# Patient Record
Sex: MALE | Race: ASIAN | NOT HISPANIC OR LATINO | URBAN - METROPOLITAN AREA
[De-identification: names, ages, dates, MRNs, and addresses within clinical notes are randomized per-mention and may not be internally consistent; named-entity substitution may affect disease eponyms.]

---

## 2022-04-24 ENCOUNTER — INPATIENT (INPATIENT)
Facility: HOSPITAL | Age: 32
LOS: 7 days | Discharge: ANOTHER HOSPITAL | DRG: 489 | End: 2022-05-02
Attending: INTERNAL MEDICINE | Admitting: INTERNAL MEDICINE
Payer: COMMERCIAL

## 2022-04-24 VITALS
WEIGHT: 199.96 LBS | RESPIRATION RATE: 16 BRPM | TEMPERATURE: 99 F | DIASTOLIC BLOOD PRESSURE: 77 MMHG | HEART RATE: 91 BPM | SYSTOLIC BLOOD PRESSURE: 116 MMHG | OXYGEN SATURATION: 99 % | HEIGHT: 67 IN

## 2022-04-24 DIAGNOSIS — M25.561 PAIN IN RIGHT KNEE: ICD-10-CM

## 2022-04-24 DIAGNOSIS — Z29.9 ENCOUNTER FOR PROPHYLACTIC MEASURES, UNSPECIFIED: ICD-10-CM

## 2022-04-24 DIAGNOSIS — S86.812A STRAIN OF OTHER MUSCLE(S) AND TENDON(S) AT LOWER LEG LEVEL, LEFT LEG, INITIAL ENCOUNTER: ICD-10-CM

## 2022-04-24 DIAGNOSIS — Z98.890 OTHER SPECIFIED POSTPROCEDURAL STATES: Chronic | ICD-10-CM

## 2022-04-24 LAB
ANION GAP SERPL CALC-SCNC: 6 MMOL/L — SIGNIFICANT CHANGE UP (ref 5–17)
BUN SERPL-MCNC: 15 MG/DL — SIGNIFICANT CHANGE UP (ref 7–18)
CALCIUM SERPL-MCNC: 9.7 MG/DL — SIGNIFICANT CHANGE UP (ref 8.4–10.5)
CHLORIDE SERPL-SCNC: 106 MMOL/L — SIGNIFICANT CHANGE UP (ref 96–108)
CO2 SERPL-SCNC: 27 MMOL/L — SIGNIFICANT CHANGE UP (ref 22–31)
CREAT SERPL-MCNC: 1.27 MG/DL — SIGNIFICANT CHANGE UP (ref 0.5–1.3)
EGFR: 77 ML/MIN/1.73M2 — SIGNIFICANT CHANGE UP
GLUCOSE SERPL-MCNC: 96 MG/DL — SIGNIFICANT CHANGE UP (ref 70–99)
HCT VFR BLD CALC: 51.2 % — HIGH (ref 39–50)
HGB BLD-MCNC: 16.8 G/DL — SIGNIFICANT CHANGE UP (ref 13–17)
MCHC RBC-ENTMCNC: 26.4 PG — LOW (ref 27–34)
MCHC RBC-ENTMCNC: 32.8 GM/DL — SIGNIFICANT CHANGE UP (ref 32–36)
MCV RBC AUTO: 80.4 FL — SIGNIFICANT CHANGE UP (ref 80–100)
NRBC # BLD: 0 /100 WBCS — SIGNIFICANT CHANGE UP (ref 0–0)
PLATELET # BLD AUTO: 307 K/UL — SIGNIFICANT CHANGE UP (ref 150–400)
POTASSIUM SERPL-MCNC: 5.3 MMOL/L — SIGNIFICANT CHANGE UP (ref 3.5–5.3)
POTASSIUM SERPL-SCNC: 5.3 MMOL/L — SIGNIFICANT CHANGE UP (ref 3.5–5.3)
RBC # BLD: 6.37 M/UL — HIGH (ref 4.2–5.8)
RBC # FLD: 12.8 % — SIGNIFICANT CHANGE UP (ref 10.3–14.5)
SARS-COV-2 RNA SPEC QL NAA+PROBE: SIGNIFICANT CHANGE UP
SODIUM SERPL-SCNC: 139 MMOL/L — SIGNIFICANT CHANGE UP (ref 135–145)
WBC # BLD: 15.69 K/UL — HIGH (ref 3.8–10.5)
WBC # FLD AUTO: 15.69 K/UL — HIGH (ref 3.8–10.5)

## 2022-04-24 PROCEDURE — 99285 EMERGENCY DEPT VISIT HI MDM: CPT

## 2022-04-24 PROCEDURE — 73562 X-RAY EXAM OF KNEE 3: CPT | Mod: 26,50

## 2022-04-24 PROCEDURE — 76376 3D RENDER W/INTRP POSTPROCES: CPT | Mod: 26

## 2022-04-24 PROCEDURE — 73700 CT LOWER EXTREMITY W/O DYE: CPT | Mod: 26,50,MA

## 2022-04-24 RX ORDER — ENOXAPARIN SODIUM 100 MG/ML
40 INJECTION SUBCUTANEOUS EVERY 24 HOURS
Refills: 0 | Status: DISCONTINUED | OUTPATIENT
Start: 2022-04-24 | End: 2022-04-24

## 2022-04-24 RX ORDER — OXYCODONE AND ACETAMINOPHEN 5; 325 MG/1; MG/1
2 TABLET ORAL EVERY 6 HOURS
Refills: 0 | Status: DISCONTINUED | OUTPATIENT
Start: 2022-04-24 | End: 2022-04-27

## 2022-04-24 RX ORDER — POLYETHYLENE GLYCOL 3350 17 G/17G
17 POWDER, FOR SOLUTION ORAL DAILY
Refills: 0 | Status: DISCONTINUED | OUTPATIENT
Start: 2022-04-24 | End: 2022-05-02

## 2022-04-24 RX ORDER — ONDANSETRON 8 MG/1
4 TABLET, FILM COATED ORAL EVERY 6 HOURS
Refills: 0 | Status: DISCONTINUED | OUTPATIENT
Start: 2022-04-24 | End: 2022-04-27

## 2022-04-24 RX ORDER — KETOROLAC TROMETHAMINE 30 MG/ML
30 SYRINGE (ML) INJECTION ONCE
Refills: 0 | Status: DISCONTINUED | OUTPATIENT
Start: 2022-04-24 | End: 2022-04-24

## 2022-04-24 RX ORDER — OXYCODONE AND ACETAMINOPHEN 5; 325 MG/1; MG/1
1 TABLET ORAL ONCE
Refills: 0 | Status: DISCONTINUED | OUTPATIENT
Start: 2022-04-24 | End: 2022-04-24

## 2022-04-24 RX ORDER — OXYCODONE AND ACETAMINOPHEN 5; 325 MG/1; MG/1
1 TABLET ORAL EVERY 4 HOURS
Refills: 0 | Status: DISCONTINUED | OUTPATIENT
Start: 2022-04-24 | End: 2022-04-27

## 2022-04-24 RX ORDER — ACETAMINOPHEN 500 MG
650 TABLET ORAL EVERY 4 HOURS
Refills: 0 | Status: DISCONTINUED | OUTPATIENT
Start: 2022-04-24 | End: 2022-04-27

## 2022-04-24 RX ADMIN — OXYCODONE AND ACETAMINOPHEN 1 TABLET(S): 5; 325 TABLET ORAL at 18:15

## 2022-04-24 RX ADMIN — OXYCODONE AND ACETAMINOPHEN 1 TABLET(S): 5; 325 TABLET ORAL at 13:19

## 2022-04-24 RX ADMIN — Medication 30 MILLIGRAM(S): at 18:39

## 2022-04-24 RX ADMIN — Medication 30 MILLIGRAM(S): at 16:41

## 2022-04-24 NOTE — ED PROVIDER NOTE - PHYSICAL EXAMINATION
General: pt lying in stretcher, appears stated age and is not in distress  HEENT: AT/NC, pink conjunctiva, anicteric sclerae,mmm  Neck: supple, full ROM, trachea midline, no JVD, no cervical LAD, no midline ttp or stepoffs  Lungs: symmetric excursion, no respiratory distress  Heart: rrr, S1, S2 normal; no S3 or S4; no murmurs or rubs  Abd: normal bowel sounds; soft, nontender;   Extremities: no clubbing, cyanosis, or edema; + palpable b/l knee effusion w/ tenderness and pain limited ROM  Skin: good turgor; no rashes, petechiae, ecchymoses, or jaundice  Pulses: radial, posterior tibialis (PT), dorsalis pedis (DP) all 2+ & symmetric  Neuro: awake, alert, responsive; oriented to person, place and time; cranial nerves intact, EOMI, intact jaw movement, intact facial sensation, no facial asymmetry, hearing intact; Motor: Normal tone in upper and lower extremities bilaterally strength 5/5; Sensory: intact

## 2022-04-24 NOTE — ED PROVIDER NOTE - CARE PLAN
1 Principal Discharge DX:	Pain of knee after injury   Principal Discharge DX:	Rupture of left patellar tendon  Secondary Diagnosis:	Ambulatory dysfunction

## 2022-04-24 NOTE — H&P ADULT - NSHPREVIEWOFSYSTEMS_GEN_ALL_CORE
CONSTITUTIONAL: No fever, weight loss, or fatigue  EYES: No eye pain, visual disturbances, or discharge  ENT:  No difficulty hearing, tinnitus, vertigo; No sinus or throat pain  NECK: No pain or stiffness  RESPIRATORY: No cough, wheezing, chills or hemoptysis; No Shortness of Breath  CARDIOVASCULAR: No chest pain, palpitations, passing out, dizziness, or leg swelling  GASTROINTESTINAL: No abdominal or epigastric pain. No nausea, vomiting, or hematemesis; No diarrhea or constipation. No melena or hematochezia.  GENITOURINARY: No dysuria, frequency, hematuria, or incontinence  NEUROLOGICAL: No headaches, memory loss, loss of strength, numbness, or tremors  SKIN: No itching, burning, rashes, or lesions   LYMPH Nodes: No enlarged glands  ENDOCRINE: No heat or cold intolerance; No hair loss  MUSCULOSKELETAL: b/l knee pain and swelling  PSYCHIATRIC: No depression, anxiety, mood swings, or difficulty sleeping  HEME/LYMPH: No easy bruising, or bleeding gums  ALLERGY AND IMMUNOLOGIC: No hives or eczema

## 2022-04-24 NOTE — ED PROVIDER NOTE - OBJECTIVE STATEMENT
32 yo m no pmh, PSH right knee surgery ORIF, now p/w b/l knee pain after fall. Pt reports he was playing basketball and when he attempted to jump up to make a shot, his legs gave out from under him and he instead fell onto his knees directly. No other injuries. He was unable to walk after. No head trauma

## 2022-04-24 NOTE — H&P ADULT - PROBLEM SELECTOR PLAN 1
Pt fell onto b/l knee, unable to ambulate  Ct shows L patellar tendon rupture, fracture of right knee hardware  b/l knee immobilizer placed in ED      Pain control  DVT prophylaxis  Ortho consulted by ED provider Pt fell onto b/l knee, unable to ambulate  Ct shows L patellar tendon rupture, fracture of right knee hardware  b/l knee immobilizer placed in ED      Pain control  PT consult  DVT prophylaxis  Ortho consulted by ED provider Pt fell onto b/l knee, unable to ambulate  Ct shows L patellar tendon rupture, fracture of right knee hardware  b/l knee immobilizer placed in ED      Pain control  PT consult  NPO at midnight, pre-op labs ordered in case of procedure tomorrow  Ortho consulted by ED provider

## 2022-04-24 NOTE — ED PROVIDER NOTE - CLINICAL SUMMARY MEDICAL DECISION MAKING FREE TEXT BOX
Pt w/ aforementioned presentation concerning for but not limited to tibial plateau fx, patellar fractures, soft tissue injury   Will get labs, imaging, treat symptoms, monitor and reassess.  Pt signed out to Dr. Jameson pending CT results

## 2022-04-24 NOTE — ED ADULT NURSE NOTE - CCCP TRG CHIEF CMPLNT
Had both Moderna vaccines, last one on 2/7/2021.  1 week after vaccine started developing cold symptoms.  Worsening cough and congestion. Tested for COVID times two, negative.  Now cough and congestion has subsided.  2 days ago with right ear pain.  Pain worsens with pushsing on right ear. Denies drainage.   knee pain/injury

## 2022-04-24 NOTE — H&P ADULT - ASSESSMENT
30 yo m no PMHx and PSHx of right knee ORIF is presenting with bilateral knee pain. Pt was playing basketball, attempted to jump but fell onto his knees. CT shows concern for patellar ligament rupture in L knee and fractured hardware in R knee with small hematoma. Ortho was consulted by ED provider, no acute intervention at this time, will reassess in a.m. Ortho will reassess in a.m.

## 2022-04-24 NOTE — H&P ADULT - NSICDXPASTSURGICALHX_GEN_ALL_CORE_FT
PAST SURGICAL HISTORY:  History of open reduction and internal fixation (ORIF) procedure Right knee

## 2022-04-24 NOTE — ED PROVIDER NOTE - PROGRESS NOTE DETAILS
Jameson:  Pt received in signout from Dr. Siegel to consult ortho.  D/w Dr. Coffey, ortho attending, and he advised to admit to medicine, does not appear to be operative at this time but his team will evaluate tomorrow morning.   He advised b/l knee immobilizers and analgesia. Gisell:  D/w Dr. Munoz and he agrees to admit.

## 2022-04-24 NOTE — H&P ADULT - NSHPPHYSICALEXAM_GEN_ALL_CORE
Vital Signs Last 24 Hrs  T(C): 37.1 (24 Apr 2022 15:18), Max: 37.1 (24 Apr 2022 10:20)  T(F): 98.7 (24 Apr 2022 15:18), Max: 98.7 (24 Apr 2022 10:20)  HR: 98 (24 Apr 2022 15:18) (91 - 98)  BP: 110/75 (24 Apr 2022 15:18) (110/75 - 116/77)  BP(mean): --  RR: 18 (24 Apr 2022 15:18) (16 - 18)  SpO2: 96% (24 Apr 2022 15:18) (96% - 99%)    GENERAL: NAD, speaks in full sentences, no signs of respiratory distress  HEAD:  Atraumatic, Normocephalic  EYES: EOMI, PERRLA, conjunctiva and sclera clear  NECK: Supple, No JVD  CHEST/LUNG: Clear to auscultation bilaterally; No wheeze; No crackles; No accessory muscles used  HEART: Regular rate and rhythm; No murmurs;   ABDOMEN: Soft, Nontender, Nondistended; Bowel sounds present; No guarding  EXTREMITIES:  + b/l knee effusion, ttp, limited ROM, pulses intact +2 b/l  PSYCH:  Normal Affect  NEUROLOGY: non-focal, AAO X 3. Strength is 5/5. no sensory loss.  SKIN: No rashes or lesions Vital Signs Last 24 Hrs  T(C): 37.1 (24 Apr 2022 15:18), Max: 37.1 (24 Apr 2022 10:20)  T(F): 98.7 (24 Apr 2022 15:18), Max: 98.7 (24 Apr 2022 10:20)  HR: 98 (24 Apr 2022 15:18) (91 - 98)  BP: 110/75 (24 Apr 2022 15:18) (110/75 - 116/77)  BP(mean): --  RR: 18 (24 Apr 2022 15:18) (16 - 18)  SpO2: 96% (24 Apr 2022 15:18) (96% - 99%)    GENERAL: NAD, speaks in full sentences, no signs of respiratory distress  HEAD:  Atraumatic, Normocephalic  EYES: EOMI, PERRLA, conjunctiva and sclera clear  NECK: Supple, No JVD  CHEST/LUNG: Clear to auscultation bilaterally; No wheeze; No crackles; No accessory muscles used  HEART: Regular rate and rhythm; No murmurs;   ABDOMEN: Soft, Nontender, Nondistended; Bowel sounds present; No guarding  EXTREMITIES:  + b/l knee effusion, ttp, limited ROM, pulses intact +2 b/l  PSYCH:  Normal Affect  NEUROLOGY: non-focal, AAO X 3. no sensory loss.  SKIN: No rashes or lesions

## 2022-04-24 NOTE — H&P ADULT - HISTORY OF PRESENT ILLNESS
32 yo m no PMHx and PSHx of right knee ORIF is presenting with bilateral knee pain. Pt was playing basketball, attempted to jump but fell onto his knees. He experienced severe pain and was unable to ambulate. He denies head trauma, CP, SOB, dizziness, paresthesias or cold extremities.     In ED: CT shows concern for patellar ligament rupture in L knee and fractured hardware in R knee with small hematoma. Ortho was consulted by ED provider, no acute intervention at this time, will reassess in a.m.

## 2022-04-25 LAB
ANION GAP SERPL CALC-SCNC: 7 MMOL/L — SIGNIFICANT CHANGE UP (ref 5–17)
BLD GP AB SCN SERPL QL: SIGNIFICANT CHANGE UP
BUN SERPL-MCNC: 21 MG/DL — HIGH (ref 7–18)
CALCIUM SERPL-MCNC: 9.6 MG/DL — SIGNIFICANT CHANGE UP (ref 8.4–10.5)
CHLORIDE SERPL-SCNC: 107 MMOL/L — SIGNIFICANT CHANGE UP (ref 96–108)
CO2 SERPL-SCNC: 27 MMOL/L — SIGNIFICANT CHANGE UP (ref 22–31)
CREAT SERPL-MCNC: 1.38 MG/DL — HIGH (ref 0.5–1.3)
EGFR: 70 ML/MIN/1.73M2 — SIGNIFICANT CHANGE UP
GLUCOSE SERPL-MCNC: 104 MG/DL — HIGH (ref 70–99)
HCT VFR BLD CALC: 49.7 % — SIGNIFICANT CHANGE UP (ref 39–50)
HGB BLD-MCNC: 16.1 G/DL — SIGNIFICANT CHANGE UP (ref 13–17)
INR BLD: 1.07 RATIO — SIGNIFICANT CHANGE UP (ref 0.88–1.16)
MCHC RBC-ENTMCNC: 26.8 PG — LOW (ref 27–34)
MCHC RBC-ENTMCNC: 32.4 GM/DL — SIGNIFICANT CHANGE UP (ref 32–36)
MCV RBC AUTO: 82.7 FL — SIGNIFICANT CHANGE UP (ref 80–100)
NRBC # BLD: 0 /100 WBCS — SIGNIFICANT CHANGE UP (ref 0–0)
PLATELET # BLD AUTO: 255 K/UL — SIGNIFICANT CHANGE UP (ref 150–400)
POTASSIUM SERPL-MCNC: 4 MMOL/L — SIGNIFICANT CHANGE UP (ref 3.5–5.3)
POTASSIUM SERPL-SCNC: 4 MMOL/L — SIGNIFICANT CHANGE UP (ref 3.5–5.3)
PROTHROM AB SERPL-ACNC: 12.7 SEC — SIGNIFICANT CHANGE UP (ref 10.5–13.4)
RBC # BLD: 6.01 M/UL — HIGH (ref 4.2–5.8)
RBC # FLD: 13.4 % — SIGNIFICANT CHANGE UP (ref 10.3–14.5)
SODIUM SERPL-SCNC: 141 MMOL/L — SIGNIFICANT CHANGE UP (ref 135–145)
WBC # BLD: 9.46 K/UL — SIGNIFICANT CHANGE UP (ref 3.8–10.5)
WBC # FLD AUTO: 9.46 K/UL — SIGNIFICANT CHANGE UP (ref 3.8–10.5)

## 2022-04-25 RX ORDER — SODIUM CHLORIDE 9 MG/ML
1000 INJECTION, SOLUTION INTRAVENOUS
Refills: 0 | Status: DISCONTINUED | OUTPATIENT
Start: 2022-04-25 | End: 2022-04-25

## 2022-04-25 RX ORDER — CHLORHEXIDINE GLUCONATE 213 G/1000ML
1 SOLUTION TOPICAL EVERY 12 HOURS
Refills: 0 | Status: DISCONTINUED | OUTPATIENT
Start: 2022-04-25 | End: 2022-04-27

## 2022-04-25 RX ORDER — MUPIROCIN 20 MG/G
1 OINTMENT TOPICAL
Refills: 0 | Status: DISCONTINUED | OUTPATIENT
Start: 2022-04-25 | End: 2022-04-27

## 2022-04-25 RX ADMIN — OXYCODONE AND ACETAMINOPHEN 1 TABLET(S): 5; 325 TABLET ORAL at 11:35

## 2022-04-25 RX ADMIN — SODIUM CHLORIDE 65 MILLILITER(S): 9 INJECTION, SOLUTION INTRAVENOUS at 16:58

## 2022-04-25 RX ADMIN — OXYCODONE AND ACETAMINOPHEN 2 TABLET(S): 5; 325 TABLET ORAL at 00:36

## 2022-04-25 RX ADMIN — CHLORHEXIDINE GLUCONATE 1 APPLICATION(S): 213 SOLUTION TOPICAL at 17:37

## 2022-04-25 RX ADMIN — OXYCODONE AND ACETAMINOPHEN 1 TABLET(S): 5; 325 TABLET ORAL at 10:43

## 2022-04-25 RX ADMIN — OXYCODONE AND ACETAMINOPHEN 2 TABLET(S): 5; 325 TABLET ORAL at 15:23

## 2022-04-25 RX ADMIN — OXYCODONE AND ACETAMINOPHEN 2 TABLET(S): 5; 325 TABLET ORAL at 00:02

## 2022-04-25 RX ADMIN — OXYCODONE AND ACETAMINOPHEN 2 TABLET(S): 5; 325 TABLET ORAL at 16:15

## 2022-04-25 RX ADMIN — MUPIROCIN 1 APPLICATION(S): 20 OINTMENT TOPICAL at 17:38

## 2022-04-25 NOTE — CONSULT NOTE ADULT - NS ATTEND AMEND GEN_ALL_CORE FT
Pt seen and examined, along with PA SP, JH, and OH.  Bilateral patellar tendon ruptures  Plan for repair on Wed 4/27 in late afternoon.  Risks discussed: infection, rerupture,, stiff joints, blood clots, etc...  He verbally consented for me to do the surgery.

## 2022-04-25 NOTE — PROGRESS NOTE ADULT - ASSESSMENT
32 yo m no PMHx and PSHx of right knee ORIF is presented with bilateral knee pain. Pt was playing basketball, attempted to jump but fell onto his knees. CT shows concern for patellar ligament rupture in L knee and fractured hardware in R knee with small hematoma. Ortho was consulted by ED provider, no acute intervention at this time, will reassess in a.m. Ortho will reassess in a.m.   32 yo m no PMHx and PSHx of right knee ORIF presented with bilateral knee pain. s/p fell onto his knees while playing basketball.  CT revealed concern for patellar ligament rupture in L knee and fractured hardware in R knee with small hematoma. Plan for surgery either today or to-rosales, Keep NPO, Ortho following.

## 2022-04-25 NOTE — PATIENT PROFILE ADULT - FALL HARM RISK - HARM RISK INTERVENTIONS

## 2022-04-25 NOTE — PROGRESS NOTE ADULT - SUBJECTIVE AND OBJECTIVE BOX
Patient is a 31y old  Male who presents with a chief complaint of     INTERVAL HPI/OVERNIGHT EVENTS: No acute evets overnight    REVIEW OF SYSTEMS:  CONSTITUTIONAL: No fever, chills  ENMT:  No difficulty hearing, no change in vision  NECK: No pain or stiffness  RESPIRATORY: No cough, SOB  CARDIOVASCULAR: No chest pain, palpitations  GASTROINTESTINAL: No abdominal pain. No nausea, vomiting, or diarrhea  GENITOURINARY: No dysuria  NEUROLOGICAL: No HA  SKIN: No itching, burning, rashes, or lesions   LYMPH NODES: No enlarged glands  ENDOCRINE: No heat or cold intolerance; No hair loss  MUSCULOSKELETAL: Bilat Knee pain  PSYCHIATRIC: No depression, anxiety  HEME/LYMPH: No easy bruising, or bleeding gums    T(C): 37.1 (04-25-22 @ 15:00), Max: 37.3 (04-25-22 @ 10:20)  HR: 83 (04-25-22 @ 15:00) (78 - 97)  BP: 137/75 (04-25-22 @ 15:00) (131/90 - 146/95)  RR: 18 (04-25-22 @ 15:00) (16 - 18)  SpO2: 97% (04-25-22 @ 15:00) (97% - 98%)  Wt(kg): --Vital Signs Last 24 Hrs  T(C): 37.1 (25 Apr 2022 15:00), Max: 37.3 (25 Apr 2022 10:20)  T(F): 98.8 (25 Apr 2022 15:00), Max: 99.1 (25 Apr 2022 10:20)  HR: 83 (25 Apr 2022 15:00) (78 - 97)  BP: 137/75 (25 Apr 2022 15:00) (131/90 - 146/95)  BP(mean): 101 (24 Apr 2022 19:30) (101 - 101)  RR: 18 (25 Apr 2022 15:00) (16 - 18)  SpO2: 97% (25 Apr 2022 15:00) (97% - 98%)    MEDICATIONS  (STANDING):  polyethylene glycol 3350 17 Gram(s) Oral daily    MEDICATIONS  (PRN):  acetaminophen     Tablet .. 650 milliGRAM(s) Oral every 4 hours PRN Mild Pain (1 - 3)  ondansetron    Tablet 4 milliGRAM(s) Oral every 6 hours PRN Nausea and/or Vomiting  oxycodone    5 mG/acetaminophen 325 mG 1 Tablet(s) Oral every 4 hours PRN Moderate Pain (4 - 6)  oxycodone    5 mG/acetaminophen 325 mG 2 Tablet(s) Oral every 6 hours PRN Severe Pain (7 - 10)    PHYSICAL EXAM:  GENERAL: NAD  EYES: clear conjunctiva; EOMI  ENMT: Moist mucous membranes  NECK: Supple, No JVD, Normal thyroid  CHEST/LUNG: Clear to auscultation bilaterally; No rales, rhonchi, wheezing, or rubs  HEART: S1, S2, Regular rate and rhythm  ABDOMEN: Soft, Nontender, Nondistended; Bowel sounds present  NEURO: Alert & Oriented X3  EXTREMITIES: BLE swelling, R knee old ORIF, site CDI, with knee mobilizer  SKIN: No rashes or lesions    Consultant(s) Notes Reviewed:  [x ] YES  [ ] NO  Care Discussed with Consultants/Other Providers [ x] YES  [ ] NO    LABS:                        16.1   9.46  )-----------( 255      ( 25 Apr 2022 06:14 )             49.7     04-25    141  |  107  |  21<H>  ----------------------------<  104<H>  4.0   |  27  |  1.38<H>    Ca    9.6      25 Apr 2022 06:14      PT/INR - ( 25 Apr 2022 06:14 )   PT: 12.7 sec;   INR: 1.07 ratio         CAPILLARY BLOOD GLUCOSE    RADIOLOGY & ADDITIONAL TESTS:    Imaging Personally Reviewed:  [x ] YES  [ ] NO  < from: CT 3D Reconstruct w/o Workstation (04.24.22 @ 14:07) >  ACC: [id number] EXAM:  CT 3D RECONSTRUCT  MAURICIO                        ACC: [id number] EXAM:  CT KNEE ONLY BI                          PROCEDURE DATE:  04/24/2022          INTERPRETATION:  CT OF THE BILATERAL KNEES    CLINICAL INFORMATION: Bilateral knee pain after playing basketball.   Evaluate for bilateral knee fractures.  TECHNIQUE: Multidetector CT of the bilateral knees. The study was   performed without the use of intravenous or intra-articular contrast.   Multiplanar reformats were generated for review. Three dimensional   reconstructions were obtained on an independent workstation. The   interpretation of these images is included in the body of the report of   the main portion of the study.    COMPARISON: Bilateral knee radiographs 4/24/2022.    FINDINGS:    RIGHT KNEE:    HARDWARE: Patient appears to be status post repair of the extensor   mechanism using cerclage wire. The wire is fractured in multiple places.    BONE: No acute fracture. No focal lytic or blastic lesions. Postsurgical   changes in the anterior aspect of the patella and tibia.    JOINTS: No dislocation. No knee joint effusion or lipohemarthrosis.   Cartilage spaces are maintained.    SOFT TISSUE: No focal muscle atrophy. Neurovascular structures are normal   in course and caliber. Edema in the subcutaneous fat along the anterior   aspect of the knee joint.      LEFT KNEE:    BONE: No acute fracture    JOINTS: No dislocation. No joint effusion or lipohemarthrosis.    SOFT TISSUE: Irregular appearance of the proximal patellar tendon   suspicious for tear. Mild amount of edema is seen in the surrounding   subcutaneous fat. No focal muscle atrophy. Neurovascular structures are   normal in course and caliber.      IMPRESSION:  1.  No acute fracture ordislocation.  2.  On the left, there is an irregular appearance of the proximal   patellar tendon concerning for patellar tendon tear. Consider   confirmation with ultrasound or MRI.  3.  Chronic postsurgical repair of the right extensor mechanism. Hardware   is fractured in multiple places, as described above.  4.  Edema in the subcutaneous fat along the anterior aspect of the right   knee suspicious for small hematoma.    --- End of Report ---            MONICO LIU MD; Attending Radiologist  This document has been electronically signed. Apr 24 2022  3:32PM    < end of copied text >  < from: CT Knee No Cont, Bilateral (04.24.22 @ 14:07) >  ACC: [id number] EXAM:  CT 3D RECONSTRUCT CHELE MARTÍNEZ                        ACC: [id number] EXAM:  CT KNEE ONLY BI                          PROCEDURE DATE:  04/24/2022          INTERPRETATION:  CT OF THE BILATERAL KNEES    CLINICAL INFORMATION: Bilateral knee pain after playing basketball.   Evaluate for bilateral knee fractures.  TECHNIQUE: Multidetector CT of the bilateral knees. The study was   performed without the use of intravenous or intra-articular contrast.   Multiplanar reformats were generated for review. Three dimensional   reconstructions were obtained on an independent workstation. The   interpretation of these images is included in the body of the report of   the main portion of the study.    COMPARISON: Bilateral knee radiographs 4/24/2022.    FINDINGS:    RIGHT KNEE:    HARDWARE: Patient appears to be status post repair of the extensor   mechanism using cerclage wire. The wire is fractured in multiple places.    BONE: No acute fracture. No focal lytic or blastic lesions. Postsurgical   changes in the anterior aspect of the patella and tibia.    JOINTS: No dislocation. No knee joint effusion or lipohemarthrosis.   Cartilage spaces are maintained.    SOFT TISSUE: No focal muscle atrophy. Neurovascular structures are normal   in course and caliber. Edema in the subcutaneous fat along the anterior   aspect of the knee joint.      LEFT KNEE:    BONE: No acute fracture    JOINTS: No dislocation. No joint effusion or lipohemarthrosis.    SOFT TISSUE: Irregular appearance of the proximal patellar tendon   suspicious for tear. Mild amount of edema is seen in the surrounding   subcutaneous fat. No focal muscle atrophy. Neurovascular structures are   normal in course and caliber.      IMPRESSION:  1.  No acute fracture ordislocation.  2.  On the left, there is an irregular appearance of the proximal   patellar tendon concerning for patellar tendon tear. Consider   confirmation with ultrasound or MRI.  3.  Chronic postsurgical repair of the right extensor mechanism. Hardware   is fractured in multiple places, as described above.  4.  Edema in the subcutaneous fat along the anterior aspect of the right   knee suspicious for small hematoma.    --- End of Report ---            MONICO LIU MD; Attending Radiologist  This document has been electronically signed. Apr 24 2022  3:32PM    < end of copied text >  < from: Xray Knee 3 Views, Bilateral (04.24.22 @ 13:42) >    ACC: [id number] EXAM:  XR KNEE AP LAT OBL 3 VIEWS BI                          PROCEDURE DATE:  04/24/2022          INTERPRETATION:  Clinical history: 31-year-old male, fractures.    Three views of each knee are correlated with a concurrent CT.    FINDINGS: No acute fracture, dislocation or suprapatellar effusion. Prior   repair of the right extensor mechanism using cerclage wires.    IMPRESSION:  No acute radiographic findings, CT performed    --- End of Report ---            ALEKSANDAR SILVER DO; Attending Radiologist  This document has been electronically signed. Apr 25 2022 12:30PM    < end of copied text >

## 2022-04-25 NOTE — PROGRESS NOTE ADULT - PROBLEM SELECTOR PLAN 1
Pt fell onto b/l knee, unable to ambulate  Ct shows L patellar tendon rupture, fracture of right knee hardware  b/l knee immobilizer placed in ED      Pain control  PT consult  NPO at midnight, pre-op labs ordered in case of procedure tomorrow  Ortho consulted by ED provider - s/p fall onto b/l knee, unable to ambulate  - Ct shows L patellar tendon rupture, fracture of right knee hardware, Old ORIF  - C/w b/l knee immobilizer placed in ED  - Plan for surgery today or tomorrow  - c/w pain management  - c/w IVF . NPO for now  - PT consult post surgery  - Orthro following

## 2022-04-25 NOTE — CONSULT NOTE ADULT - SUBJECTIVE AND OBJECTIVE BOX
Pt Name: YANCI QURESHI  MRN: [id number]    ORTHOPEDIC CONSULT    Orthopedic diagnosis:    31yMaleHPI:  30 yo m no PMHx and PSHx of right knee ORIF is presenting with bilateral knee pain. Pt was playing basketball, attempted to jump but fell onto his knees. He experienced severe pain and was unable to ambulate. He denies head trauma, CP, SOB, dizziness, paresthesias or cold extremities.     In ED: CT shows concern for patellar ligament rupture in L knee and fractured hardware in R knee with small hematoma. Ortho was consulted by ED provider, no acute intervention at this time, will reassess in a.m. (24 Apr 2022 18:44)    Patient is s/p sports injury - 4/24 @ 10 am, patient was playing basketball and attempted to jump. He was unable to jump and instead fell forward due to the momentum. he felt immediate pain and inability to bear weight or ambulate. denies Head trauma, LOC, chest pain, SOB, dizziness, etc. He has history of previous right patellar fracture ORIF 6 years ago in the [facility] due to a basketball injury. The patient has minimal throbbing constant pain in the left knee 3/10 and intermittent sharp pain in the right knee 7/10 and 2/10 at rest. Pt denies Chest pain, SOB, dyspnea, paresthesias, N/V/D, abdominal pain, syncope, or pain anywhere else.         AMBULATION: Baseline Ambulation  [   xxx  ] independent   [     ] With Cane   [     ] With Walker   [     ]  Bedbound   [     ] Pivot transfers to Wheelchair only    4M's age-friendly:  - Medication: age-appropriate  - Mentation:  - Mobility:   - Matters-Most/Goals of Care:    PAST MEDICAL & SURGICAL HISTORY:  History of open reduction and internal fixation (ORIF) procedure  Right knee        ALLERGIES: No Known Allergies      MEDICATIONS: acetaminophen     Tablet .. 650 milliGRAM(s) Oral every 4 hours PRN  ondansetron    Tablet 4 milliGRAM(s) Oral every 6 hours PRN  oxycodone    5 mG/acetaminophen 325 mG 1 Tablet(s) Oral every 4 hours PRN  oxycodone    5 mG/acetaminophen 325 mG 2 Tablet(s) Oral every 6 hours PRN  polyethylene glycol 3350 17 Gram(s) Oral daily      PHYSICAL EXAM:    Vital Signs Last 24 Hrs  T(C): 37.3 (25 Apr 2022 10:20), Max: 37.3 (25 Apr 2022 10:20)  T(F): 99.1 (25 Apr 2022 10:20), Max: 99.1 (25 Apr 2022 10:20)  HR: 86 (25 Apr 2022 10:20) (78 - 98)  BP: 139/81 (25 Apr 2022 10:20) (110/75 - 146/95)  BP(mean): 101 (24 Apr 2022 19:30) (101 - 101)  RR: 16 (25 Apr 2022 10:20) (16 - 18)  SpO2: 98% (25 Apr 2022 10:20) (96% - 98%)    Gen: well developed, well nourished, comfortable  MSK:  Skin pink, warm. No open lesions.  no ct  calves soft  DP/PT 2+, brisk b/l  nvi silt  5/5 strength ehl/ta/gastroc b/l.   BLLE: knee immobilizers on both knees. unable to straight leg raise bilaterally. skin intact. significant effusion bilaterally. minimal pain to palpation bilaterally. passive SLR causes no pain.   longitudinal linear healed incision noted of the right knee. skin intact. grossly nvi. distal pulses 2+. silt.          LABS:                        16.1   9.46  )-----------( 255      ( 25 Apr 2022 06:14 )             49.7     04-25    141  |  107  |  21<H>  ----------------------------<  104<H>  4.0   |  27  |  1.38<H>    Ca    9.6      25 Apr 2022 06:14      PT/INR - ( 25 Apr 2022 06:14 )   PT: 12.7 sec;   INR: 1.07 ratio             RADIOLOGY:   < from: CT 3D Reconstruct w/o Workstation (04.24.22 @ 14:07) >    ACC: [id number] EXAM:  CT 3D RECONSTRUCT CHELE MARTÍNEZ                        ACC: [id number] EXAM:  CT KNEE ONLY BI                          PROCEDURE DATE:  04/24/2022          INTERPRETATION:  CT OF THE BILATERAL KNEES    CLINICAL INFORMATION: Bilateral knee pain after playing basketball.   Evaluate for bilateral knee fractures.  TECHNIQUE: Multidetector CT of the bilateral knees. The study was   performed without the use of intravenous or intra-articular contrast.   Multiplanar reformats were generated for review. Three dimensional   reconstructions were obtained on an independent workstation. The   interpretation of these images is included in the body of the report of   the main portion of the study.    COMPARISON: Bilateral knee radiographs 4/24/2022.    FINDINGS:    RIGHT KNEE:    HARDWARE: Patient appears to be status post repair of the extensor   mechanism using cerclage wire. The wire is fractured in multiple places.    BONE: No acute fracture. No focal lytic or blastic lesions. Postsurgical   changes in the anterior aspect of the patella and tibia.    JOINTS: No dislocation. No knee joint effusion or lipohemarthrosis.   Cartilage spaces are maintained.    SOFT TISSUE: No focal muscle atrophy. Neurovascular structures are normal   in course and caliber. Edema in the subcutaneous fat along the anterior   aspect of the knee joint.      LEFT KNEE:    BONE: No acute fracture    JOINTS: No dislocation. No joint effusion or lipohemarthrosis.    SOFT TISSUE: Irregular appearance of the proximal patellar tendon   suspicious for tear. Mild amount of edema is seen in the surrounding   subcutaneous fat. No focal muscle atrophy. Neurovascular structures are   normal in course and caliber.      IMPRESSION:  1.  No acute fracture ordislocation.  2.  On the left, there is an irregular appearance of the proximal   patellar tendon concerning for patellar tendon tear. Consider   confirmation with ultrasound or MRI.  3.  Chronic postsurgical repair of the right extensor mechanism. Hardware   is fractured in multiple places, as described above.  4.  Edema in the subcutaneous fat along the anterior aspect of the right   knee suspicious for small hematoma.    --- End of Report ---            MONICO LIU MD; Attending Radiologist  This document has been electronically signed. Apr 24 2022  3:32PM    < end of copied text >        A/P:   31y Male with:  Clinically bilateral patellar tendon ruptures with h/o Right patellar ORIF in the past.     #  -  Recommendation: [  ] Conservative treatment   [  xxx] Surgical treatment/fixation  -  All the patient's questions were answered. Pt was explained the Plan, mentioned above, thoroughly.  Risks/benefits, complications were also explained, which includes but not limited to: persistent, infection, death, PNA, thrombombolism, etc. Pt understands.   -  Pain control  -  DVT prophylaxis  -  Daily PT  -  Case d/w __  -  Pt is orthopedically stable for discharge.     - Condition: Stable  - Pre-op for Surgery either today or tomorrow   - Procedure: Bilateral Patellar tendon reconstruction/repair  - Surgeon: Dr. Coffey  - Clearance: Pending  - Consent: verbally consented  - Medical Clearance called  - Keep NPO today until final decision about OR time is made due to OR availability   - IVF when NPO  - Hold Anticoagulants today  - Keep affected extremity elevated on 2 pillows  - Pain Management  - Case Discussed with DR. Coffey Pt Name: YANCI QURESHI  MRN: [id number]    ORTHOPEDIC CONSULT    Orthopedic diagnosis:    31yMaleHPI:  32 yo m no PMHx and PSHx of right knee ORIF is presenting with bilateral knee pain. Pt was playing basketball, attempted to jump but fell onto his knees. He experienced severe pain and was unable to ambulate. He denies head trauma, CP, SOB, dizziness, paresthesias or cold extremities.     In ED: CT shows concern for patellar ligament rupture in L knee and fractured hardware in R knee with small hematoma. Ortho was consulted by ED provider, no acute intervention at this time, will reassess in a.m. (24 Apr 2022 18:44)    Patient is s/p sports injury - 4/24 @ 10 am, patient was playing basketball and attempted to jump. He was unable to jump and instead fell forward due to the momentum. he felt immediate pain and inability to bear weight or ambulate. denies Head trauma, LOC, chest pain, SOB, dizziness, etc. He has history of previous right patellar fracture ORIF 6 years ago in the [facility] due to a basketball injury. The patient has minimal throbbing constant pain in the left knee 3/10 and intermittent sharp pain in the right knee 7/10 and 2/10 at rest. Pt denies Chest pain, SOB, dyspnea, paresthesias, N/V/D, abdominal pain, syncope, or pain anywhere else.         AMBULATION: Baseline Ambulation  [   xxx  ] independent   [     ] With Cane   [     ] With Walker   [     ]  Bedbound   [     ] Pivot transfers to Wheelchair only    4M's age-friendly:  - Medication: age-appropriate  - Mentation:  - Mobility:   - Matters-Most/Goals of Care:    PAST MEDICAL & SURGICAL HISTORY:  History of open reduction and internal fixation (ORIF) procedure  Right knee        ALLERGIES: No Known Allergies      MEDICATIONS: acetaminophen     Tablet .. 650 milliGRAM(s) Oral every 4 hours PRN  ondansetron    Tablet 4 milliGRAM(s) Oral every 6 hours PRN  oxycodone    5 mG/acetaminophen 325 mG 1 Tablet(s) Oral every 4 hours PRN  oxycodone    5 mG/acetaminophen 325 mG 2 Tablet(s) Oral every 6 hours PRN  polyethylene glycol 3350 17 Gram(s) Oral daily      PHYSICAL EXAM:    Vital Signs Last 24 Hrs  T(C): 37.3 (25 Apr 2022 10:20), Max: 37.3 (25 Apr 2022 10:20)  T(F): 99.1 (25 Apr 2022 10:20), Max: 99.1 (25 Apr 2022 10:20)  HR: 86 (25 Apr 2022 10:20) (78 - 98)  BP: 139/81 (25 Apr 2022 10:20) (110/75 - 146/95)  BP(mean): 101 (24 Apr 2022 19:30) (101 - 101)  RR: 16 (25 Apr 2022 10:20) (16 - 18)  SpO2: 98% (25 Apr 2022 10:20) (96% - 98%)    Gen: well developed, well nourished, comfortable  MSK:  Skin pink, warm. No open lesions.  no ct  calves soft  DP/PT 2+, brisk b/l  nvi silt  5/5 strength ehl/ta/gastroc b/l.   BLLE: knee immobilizers on both knees. unable to straight leg raise bilaterally. skin intact. significant effusion bilaterally. minimal pain to palpation bilaterally. passive SLR causes no pain.   longitudinal linear healed incision noted of the right knee. skin intact. grossly nvi. distal pulses 2+. silt.          LABS:                        16.1   9.46  )-----------( 255      ( 25 Apr 2022 06:14 )             49.7     04-25    141  |  107  |  21<H>  ----------------------------<  104<H>  4.0   |  27  |  1.38<H>    Ca    9.6      25 Apr 2022 06:14      PT/INR - ( 25 Apr 2022 06:14 )   PT: 12.7 sec;   INR: 1.07 ratio             RADIOLOGY:   < from: CT 3D Reconstruct w/o Workstation (04.24.22 @ 14:07) >    ACC: [id number] EXAM:  CT 3D RECONSTRUCT CHELE MARTÍNEZ                        ACC: [id number] EXAM:  CT KNEE ONLY BI                          PROCEDURE DATE:  04/24/2022          INTERPRETATION:  CT OF THE BILATERAL KNEES    CLINICAL INFORMATION: Bilateral knee pain after playing basketball.   Evaluate for bilateral knee fractures.  TECHNIQUE: Multidetector CT of the bilateral knees. The study was   performed without the use of intravenous or intra-articular contrast.   Multiplanar reformats were generated for review. Three dimensional   reconstructions were obtained on an independent workstation. The   interpretation of these images is included in the body of the report of   the main portion of the study.    COMPARISON: Bilateral knee radiographs 4/24/2022.    FINDINGS:    RIGHT KNEE:    HARDWARE: Patient appears to be status post repair of the extensor   mechanism using cerclage wire. The wire is fractured in multiple places.    BONE: No acute fracture. No focal lytic or blastic lesions. Postsurgical   changes in the anterior aspect of the patella and tibia.    JOINTS: No dislocation. No knee joint effusion or lipohemarthrosis.   Cartilage spaces are maintained.    SOFT TISSUE: No focal muscle atrophy. Neurovascular structures are normal   in course and caliber. Edema in the subcutaneous fat along the anterior   aspect of the knee joint.      LEFT KNEE:    BONE: No acute fracture    JOINTS: No dislocation. No joint effusion or lipohemarthrosis.    SOFT TISSUE: Irregular appearance of the proximal patellar tendon   suspicious for tear. Mild amount of edema is seen in the surrounding   subcutaneous fat. No focal muscle atrophy. Neurovascular structures are   normal in course and caliber.      IMPRESSION:  1.  No acute fracture ordislocation.  2.  On the left, there is an irregular appearance of the proximal   patellar tendon concerning for patellar tendon tear. Consider   confirmation with ultrasound or MRI.  3.  Chronic postsurgical repair of the right extensor mechanism. Hardware   is fractured in multiple places, as described above.  4.  Edema in the subcutaneous fat along the anterior aspect of the right   knee suspicious for small hematoma.    --- End of Report ---            MONICO LIU MD; Attending Radiologist  This document has been electronically signed. Apr 24 2022  3:32PM    < end of copied text >        A/P:   31y Male with:  Clinically bilateral patellar tendon ruptures with h/o Right patellar ORIF in the past.     #  -  Recommendation: [  ] Conservative treatment   [  xxx] Surgical treatment/fixation  -  All the patient's questions were answered. Pt was explained the Plan, mentioned above, thoroughly.  Risks/benefits, complications were also explained, which includes but not limited to: persistent, infection, death, PNA, thrombombolism, etc. Pt understands.   -  Pain control  -  DVT prophylaxis  -  Daily PT  -  Case d/w __  -  Pt is orthopedically stable for discharge.     - Condition: Stable  - Pre-op for Surgery either today or tomorrow   - Procedure: Bilateral Patellar tendon reconstruction/repair  - Surgeon: Dr. Coffey  - Clearance: Pending  - Consent: verbally consented  - Medical Clearance called  - Keep NPO today until final decision about OR time is made due to OR availability   - IVF when NPO  - Hold Anticoagulants today  - Keep affected extremity elevated on 2 pillows  - Pain Management  - Case Discussed with DR. Coffey    UPDATE: Patient will not have surgery today. plan is for Wednesday, 4/27/22 with Dr. Coffey in the early PM. Patient was made aware around 4 pm and taken off npo   currently OR is not possible today because of availability   Medical clearance obtained   IVF When NPO  NWB to bilateral LE at this time

## 2022-04-26 ENCOUNTER — TRANSCRIPTION ENCOUNTER (OUTPATIENT)
Age: 32
End: 2022-04-26

## 2022-04-26 LAB
ANION GAP SERPL CALC-SCNC: 7 MMOL/L — SIGNIFICANT CHANGE UP (ref 5–17)
APTT BLD: 30.4 SEC — SIGNIFICANT CHANGE UP (ref 27.5–35.5)
BLD GP AB SCN SERPL QL: SIGNIFICANT CHANGE UP
BUN SERPL-MCNC: 17 MG/DL — SIGNIFICANT CHANGE UP (ref 7–18)
CALCIUM SERPL-MCNC: 9.2 MG/DL — SIGNIFICANT CHANGE UP (ref 8.4–10.5)
CHLORIDE SERPL-SCNC: 105 MMOL/L — SIGNIFICANT CHANGE UP (ref 96–108)
CO2 SERPL-SCNC: 28 MMOL/L — SIGNIFICANT CHANGE UP (ref 22–31)
CREAT SERPL-MCNC: 1.38 MG/DL — HIGH (ref 0.5–1.3)
EGFR: 70 ML/MIN/1.73M2 — SIGNIFICANT CHANGE UP
GLUCOSE SERPL-MCNC: 103 MG/DL — HIGH (ref 70–99)
HCT VFR BLD CALC: 49.4 % — SIGNIFICANT CHANGE UP (ref 39–50)
HGB BLD-MCNC: 15.9 G/DL — SIGNIFICANT CHANGE UP (ref 13–17)
INR BLD: 1.06 RATIO — SIGNIFICANT CHANGE UP (ref 0.88–1.16)
MCHC RBC-ENTMCNC: 26.5 PG — LOW (ref 27–34)
MCHC RBC-ENTMCNC: 32.2 GM/DL — SIGNIFICANT CHANGE UP (ref 32–36)
MCV RBC AUTO: 82.2 FL — SIGNIFICANT CHANGE UP (ref 80–100)
MRSA PCR RESULT.: SIGNIFICANT CHANGE UP
NRBC # BLD: 0 /100 WBCS — SIGNIFICANT CHANGE UP (ref 0–0)
PLATELET # BLD AUTO: 269 K/UL — SIGNIFICANT CHANGE UP (ref 150–400)
POTASSIUM SERPL-MCNC: 4 MMOL/L — SIGNIFICANT CHANGE UP (ref 3.5–5.3)
POTASSIUM SERPL-SCNC: 4 MMOL/L — SIGNIFICANT CHANGE UP (ref 3.5–5.3)
PROTHROM AB SERPL-ACNC: 12.6 SEC — SIGNIFICANT CHANGE UP (ref 10.5–13.4)
RBC # BLD: 6.01 M/UL — HIGH (ref 4.2–5.8)
RBC # FLD: 13.2 % — SIGNIFICANT CHANGE UP (ref 10.3–14.5)
S AUREUS DNA NOSE QL NAA+PROBE: DETECTED
SODIUM SERPL-SCNC: 140 MMOL/L — SIGNIFICANT CHANGE UP (ref 135–145)
WBC # BLD: 9.32 K/UL — SIGNIFICANT CHANGE UP (ref 3.8–10.5)
WBC # FLD AUTO: 9.32 K/UL — SIGNIFICANT CHANGE UP (ref 3.8–10.5)

## 2022-04-26 RX ORDER — SODIUM CHLORIDE 9 MG/ML
1000 INJECTION, SOLUTION INTRAVENOUS
Refills: 0 | Status: DISCONTINUED | OUTPATIENT
Start: 2022-04-26 | End: 2022-04-27

## 2022-04-26 RX ADMIN — CHLORHEXIDINE GLUCONATE 1 APPLICATION(S): 213 SOLUTION TOPICAL at 06:57

## 2022-04-26 RX ADMIN — OXYCODONE AND ACETAMINOPHEN 1 TABLET(S): 5; 325 TABLET ORAL at 19:25

## 2022-04-26 RX ADMIN — MUPIROCIN 1 APPLICATION(S): 20 OINTMENT TOPICAL at 06:00

## 2022-04-26 RX ADMIN — POLYETHYLENE GLYCOL 3350 17 GRAM(S): 17 POWDER, FOR SOLUTION ORAL at 11:18

## 2022-04-26 RX ADMIN — MUPIROCIN 1 APPLICATION(S): 20 OINTMENT TOPICAL at 17:20

## 2022-04-26 RX ADMIN — OXYCODONE AND ACETAMINOPHEN 1 TABLET(S): 5; 325 TABLET ORAL at 08:08

## 2022-04-26 RX ADMIN — OXYCODONE AND ACETAMINOPHEN 1 TABLET(S): 5; 325 TABLET ORAL at 09:00

## 2022-04-26 RX ADMIN — CHLORHEXIDINE GLUCONATE 1 APPLICATION(S): 213 SOLUTION TOPICAL at 17:21

## 2022-04-26 RX ADMIN — OXYCODONE AND ACETAMINOPHEN 1 TABLET(S): 5; 325 TABLET ORAL at 21:00

## 2022-04-26 NOTE — PROGRESS NOTE ADULT - SUBJECTIVE AND OBJECTIVE BOX
INTERVAL HPI/OVERNIGHT EVENTS:  Patient seen,stabel ,no acute issues  VITAL SIGNS:  T(F): 97.8 (04-26-22 @ 14:44)  HR: 82 (04-26-22 @ 14:44)  BP: 150/90 (04-26-22 @ 14:44)  RR: 20 (04-26-22 @ 14:44)  SpO2: 99% (04-26-22 @ 14:44)  Wt(kg): --    PHYSICAL EXAM:  awake  Constitutional:  Eyes:  ENMT:perrla  Neck:  Respiratory:clear  Cardiovascular:s1s2,m-none  Gastrointestinal:soft,bs pos  Extremities:both knees mild tenderness  Vascular:  Neurological:no focal deficit  Musculoskeletal:    MEDICATIONS  (STANDING):  chlorhexidine 2% Cloths 1 Application(s) Topical every 12 hours  mupirocin 2% Ointment 1 Application(s) Both Nostrils two times a day  polyethylene glycol 3350 17 Gram(s) Oral daily    MEDICATIONS  (PRN):  acetaminophen     Tablet .. 650 milliGRAM(s) Oral every 4 hours PRN Mild Pain (1 - 3)  ondansetron    Tablet 4 milliGRAM(s) Oral every 6 hours PRN Nausea and/or Vomiting  oxycodone    5 mG/acetaminophen 325 mG 1 Tablet(s) Oral every 4 hours PRN Moderate Pain (4 - 6)  oxycodone    5 mG/acetaminophen 325 mG 2 Tablet(s) Oral every 6 hours PRN Severe Pain (7 - 10)      Allergies    No Known Allergies    Intolerances        LABS:                        15.9   9.32  )-----------( 269      ( 26 Apr 2022 06:50 )             49.4     04-26    140  |  105  |  17  ----------------------------<  103<H>  4.0   |  28  |  1.38<H>    Ca    9.2      26 Apr 2022 06:50      PT/INR - ( 26 Apr 2022 06:50 )   PT: 12.6 sec;   INR: 1.06 ratio         PTT - ( 26 Apr 2022 06:50 )  PTT:30.4 sec      RADIOLOGY & ADDITIONAL TESTS:      Assessment and Plan:   · Assessment	  32 yo m no PMHx and PSHx of right knee ORIF presented with bilateral knee pain. s/p fell onto his knees while playing basketball.  CT revealed concern for patellar ligament rupture in L knee and fractured hardware in R knee with small hematoma. Plan for surgery either today or to-rosales, Keep NPO, Ortho following.       COVID-19 Negative Lab Result:  · COVID-19 Negative Lab Result	COVID-19 ruled out     Problem/Plan - 1:  ·  Problem: Bilateral knee pain.   ·  Plan: - s/p fall onto b/l knee, unable to ambulate  - Ct shows L patellar tendon rupture, fracture of right knee hardware, Old ORIF  - C/w b/l knee immobilizer placed in ED  - Plan for surgery wednesday  - c/w pain management  - PT consult post surgery  - Orthro following.     Problem/Plan - 2:  ·  Problem: Prophylactic measure.   ·  Plan: - Hold ac for now, anticipate surgery.

## 2022-04-26 NOTE — PROGRESS NOTE ADULT - ASSESSMENT
32 yo m no PMHx and PSHx of right knee ORIF presented with bilateral knee pain. s/p fell onto his knees while playing basketball.  CT revealed concern for patellar ligament rupture in L knee and fractured hardware in R knee with small hematoma. Plan for surgery Wednesday for tendon reconstruction.  Ortho following.

## 2022-04-26 NOTE — PROGRESS NOTE ADULT - SUBJECTIVE AND OBJECTIVE BOX
Patient is a 31y old  Male who presents with a chief complaint of b/l knee pain    INTERVAL HPI/OVERNIGHT EVENTS: no overnight events    I&O's Summary    Vital Signs Last 24 Hrs  T(C): 36.6 (26 Apr 2022 14:44), Max: 36.6 (25 Apr 2022 20:23)  T(F): 97.8 (26 Apr 2022 14:44), Max: 97.9 (25 Apr 2022 20:23)  HR: 82 (26 Apr 2022 14:44) (82 - 87)  BP: 150/90 (26 Apr 2022 14:44) (141/80 - 150/90)  BP(mean): --  RR: 20 (26 Apr 2022 14:44) (18 - 20)  SpO2: 99% (26 Apr 2022 14:44) (97% - 99%)  PAST MEDICAL & SURGICAL HISTORY:  History of open reduction and internal fixation (ORIF) procedure  Right knee        SOCIAL HISTORY  Alcohol:  Tobacco:  Illicit substance use:      FAMILY HISTORY:      LABS:                        15.9   9.32  )-----------( 269      ( 26 Apr 2022 06:50 )             49.4     04-26    140  |  105  |  17  ----------------------------<  103<H>  4.0   |  28  |  1.38<H>    Ca    9.2      26 Apr 2022 06:50      PT/INR - ( 26 Apr 2022 06:50 )   PT: 12.6 sec;   INR: 1.06 ratio         PTT - ( 26 Apr 2022 06:50 )  PTT:30.4 sec    CAPILLARY BLOOD GLUCOSE                MEDICATIONS  (STANDING):  chlorhexidine 2% Cloths 1 Application(s) Topical every 12 hours  mupirocin 2% Ointment 1 Application(s) Both Nostrils two times a day  polyethylene glycol 3350 17 Gram(s) Oral daily    MEDICATIONS  (PRN):  acetaminophen     Tablet .. 650 milliGRAM(s) Oral every 4 hours PRN Mild Pain (1 - 3)  ondansetron    Tablet 4 milliGRAM(s) Oral every 6 hours PRN Nausea and/or Vomiting  oxycodone    5 mG/acetaminophen 325 mG 1 Tablet(s) Oral every 4 hours PRN Moderate Pain (4 - 6)  oxycodone    5 mG/acetaminophen 325 mG 2 Tablet(s) Oral every 6 hours PRN Severe Pain (7 - 10)      REVIEW OF SYSTEMS:  CONSTITUTIONAL: No fever, weight loss, or fatigue  EYES: No eye pain, visual disturbances, or discharge  ENMT:  No difficulty hearing, tinnitus, vertigo; No sinus or throat pain  NECK: No pain or stiffness  RESPIRATORY: No cough, wheezing, chills or hemoptysis; No shortness of breath  CARDIOVASCULAR: No chest pain, palpitations, dizziness, or leg swelling  GASTROINTESTINAL: No abdominal or epigastric pain. No nausea, vomiting, or hematemesis; No diarrhea or constipation. No melena or hematochezia.  GENITOURINARY: No dysuria, frequency, hematuria, or incontinence  NEUROLOGICAL: No headaches, memory loss, loss of strength, numbness, or tremors  SKIN: No itching, burning, rashes, or lesions   LYMPH NODES: No enlarged glands  ENDOCRINE: No heat or cold intolerance; No hair loss  MUSCULOSKELETAL: No joint pain or swelling; No muscle, back, or extremity pain  PSYCHIATRIC: No depression, anxiety, mood swings, or difficulty sleeping  HEME/LYMPH: No easy bruising, or bleeding gums  ALLERY AND IMMUNOLOGIC: No hives or eczema    RADIOLOGY & ADDITIONAL TESTS:< from: Xray Knee 3 Views, Bilateral (04.24.22 @ 13:42) >  CC: [id number] EXAM:  XR KNEE AP LAT OBL 3 VIEWS BI                          PROCEDURE DATE:  04/24/2022          INTERPRETATION:  Clinical history: 31-year-old male, fractures.    Three views of each knee are correlated with a concurrent CT.    FINDINGS: No acute fracture, dislocation or suprapatellar effusion. Prior   repair of the right extensor mechanism using cerclage wires.    IMPRESSION:  No acute radiographic findings, CT performed    --- End of Report ---            ALEKSANDAR SILVER DO; Attending Radiologist  This document has been electronically signed. Apr 25 2022 12:30PM    < end of copied text >  < from: CT Knee No Cont, Bilateral (04.24.22 @ 14:07) >    ACC: [id number] EXAM:  CT 3D RECONSTRUCT CHELE MARTÍNEZ                        ACC: [id number] EXAM:  CT KNEE ONLY BI                          PROCEDURE DATE:  04/24/2022          INTERPRETATION:  CT OF THE BILATERAL KNEES    CLINICAL INFORMATION: Bilateral knee pain after playing basketball.   Evaluate for bilateral knee fractures.  TECHNIQUE: Multidetector CT of the bilateral knees. The study was   performed without the use of intravenous or intra-articular contrast.   Multiplanar reformats were generated for review. Three dimensional   reconstructions were obtained on an independent workstation. The   interpretation of these images is included in the body of the report of   the main portion of the study.    COMPARISON: Bilateral knee radiographs 4/24/2022.    FINDINGS:    RIGHT KNEE:    HARDWARE: Patient appears to be status post repair of the extensor   mechanism using cerclage wire. The wire is fractured in multiple places.    BONE: No acute fracture. No focal lytic or blastic lesions. Postsurgical   changes in the anterior aspect of the patella and tibia.    JOINTS: No dislocation. No knee joint effusion or lipohemarthrosis.   Cartilage spaces are maintained.    SOFT TISSUE: No focal muscle atrophy. Neurovascular structures are normal   in course and caliber. Edema in the subcutaneous fat along the anterior   aspect of the knee joint.      LEFT KNEE:    BONE: No acute fracture    JOINTS: No dislocation. No joint effusion or lipohemarthrosis.    SOFT TISSUE: Irregular appearance of the proximal patellar tendon   suspicious for tear. Mild amount of edema is seen in the surrounding   subcutaneous fat. No focal muscle atrophy. Neurovascular structures are   normal in course and caliber.      IMPRESSION:  1.  No acute fracture ordislocation.  2.  On the left, there is an irregular appearance of the proximal   patellar tendon concerning for patellar tendon tear. Consider   confirmation with ultrasound or MRI.  3.  Chronic postsurgical repair of the right extensor mechanism. Hardware   is fractured in multiple places, as described above.  4.  Edema in the subcutaneous fat along the anterior aspect of the right   knee suspicious for small hematoma.    --- End of Report ---            MONICO LIU MD; Attending Radiologist  This document has been electronically signed. Apr 24 2022  3:32PM    < end of copied text >  < from: CT 3D Reconstruct w/o Workstation (04.24.22 @ 14:07) >  ACC: [id number] EXAM:  CT 3D RECONSTRUCT CHELE MARTÍNEZ                        ACC: [id number] EXAM:  CT KNEE ONLY BI                          PROCEDURE DATE:  04/24/2022          INTERPRETATION:  CT OF THE BILATERAL KNEES    CLINICAL INFORMATION: Bilateral knee pain after playing basketball.   Evaluate for bilateral knee fractures.  TECHNIQUE: Multidetector CT of the bilateral knees. The study was   performed without the use of intravenous or intra-articular contrast.   Multiplanar reformats were generated for review. Three dimensional   reconstructions were obtained on an independent workstation. The   interpretation of these images is included in the body of the report of   the main portion of the study.    COMPARISON: Bilateral knee radiographs 4/24/2022.    FINDINGS:    RIGHT KNEE:    HARDWARE: Patient appears to be status post repair of the extensor   mechanism using cerclage wire. The wire is fractured in multiple places.    BONE: No acute fracture. No focal lytic or blastic lesions. Postsurgical   changes in the anterior aspect of the patella and tibia.    JOINTS: No dislocation. No knee joint effusion or lipohemarthrosis.   Cartilage spaces are maintained.    SOFT TISSUE: No focal muscle atrophy. Neurovascular structures are normal   in course and caliber. Edema in the subcutaneous fat along the anterior   aspect of the knee joint.      LEFT KNEE:    BONE: No acute fracture    JOINTS: No dislocation. No joint effusion or lipohemarthrosis.    SOFT TISSUE: Irregular appearance of the proximal patellar tendon   suspicious for tear. Mild amount of edema is seen in the surrounding   subcutaneous fat. No focal muscle atrophy. Neurovascular structures are   normal in course and caliber.      IMPRESSION:  1.  No acute fracture ordislocation.  2.  On the left, there is an irregular appearance of the proximal   patellar tendon concerning for patellar tendon tear. Consider   confirmation with ultrasound or MRI.  3.  Chronic postsurgical repair of the right extensor mechanism. Hardware   is fractured in multiple places, as described above.  4.  Edema in the subcutaneous fat along the anterior aspect of the right   knee suspicious for small hematoma.    --- End of Report ---            MONICO LIU MD; Attending Radiologist  This document has been electronically signed. Apr 24 2022  3:32PM    < end of copied text >      Imaging Personally Reviewed:  [x ] YES  [ ] NO    Consultant(s) Notes Reviewed:  [ x] YES  [ ] NO    PHYSICAL EXAM:  GENERAL: NAD  HEAD:  Atraumatic, Normocephalic  EYES:  conjunctiva and sclera clear  ENMT:  Moist mucous membranes, Good dentition  NECK: Supple, No JVD  NERVOUS SYSTEM:  Alert & Oriented X3, Good concentration  CHEST/LUNG: Clear to auscultation bilaterally; No rales, rhonchi, wheezing, or rubs  HEART: Regular rate and rhythm; No murmurs, rubs, or gallops  ABDOMEN: Soft, Nontender, Nondistended; Bowel sounds present  EXTREMITIES:  2+ Peripheral Pulses, No clubbing, cyanosis. + b/l knee edema  SKIN: No rashes or lesions    Care Collaborated Discussed with Consultants/Other Providers [ x] YES  [ ] NO

## 2022-04-26 NOTE — PROGRESS NOTE ADULT - PROBLEM SELECTOR PLAN 1
- s/p fall onto b/l knee, unable to ambulate  - Ct shows L patellar tendon rupture, fracture of right knee hardware, Old ORIF  - C/w b/l knee immobilizer placed in ED  - Plan for surgery wednesday 4/27/22  - c/w pain management  - c/w IVF . NPO after midnight  - PT consult post surgery  - Orthro following

## 2022-04-26 NOTE — PROGRESS NOTE ADULT - SUBJECTIVE AND OBJECTIVE BOX
31yMale    Diagnosis:  Bilateral Patella Tendon Rupture    Patient was seen and evaluated at bedside. Patient with no acute complaints.   Pain is well controlled.   Denies CP/SOB, dyspnea, paresthesias, N/V/D, palpitations.     Vital Signs Last 24 Hrs  T(C): 36.6 (26 Apr 2022 05:05), Max: 37.3 (25 Apr 2022 10:20)  T(F): 97.8 (26 Apr 2022 05:05), Max: 99.1 (25 Apr 2022 10:20)  HR: 87 (26 Apr 2022 05:05) (83 - 87)  BP: 141/80 (26 Apr 2022 05:05) (137/75 - 145/96)  BP(mean): --  RR: 18 (26 Apr 2022 05:05) (16 - 18)  SpO2: 97% (26 Apr 2022 05:05) (97% - 98%)  I&O's Detail      Physical Exam:    General: AAOx3, NAD, resting comfortably in bed.    L Knee: Knee immobilizer in place. Skin intact. Swelling noted. Calves soft and NT. ROM of toes and ankle plantarflexion/dorsiflexion. NVI.  R Knee: Knee immobilizer in place. Skin intact. Swelling noted. Calves soft and NT. ROM of toes and ankle plantarflexion/dorsiflexion. NVI.                          15.9   9.32  )-----------( 269      ( 26 Apr 2022 06:50 )             49.4     04-26    140  |  105  |  17  ----------------------------<  103<H>  4.0   |  28  |  1.38<H>    Ca    9.2      26 Apr 2022 06:50        Impression:  31yMale Bilateral Patella Tendon Rupture  Plan:  -  Pain management  -  Dvt prophylaxis  -  For OR (Bilateral Patella Tendon Rupture Repair) tomorrow in early PM 4/27/22  -  Obtain consent for surgery  -  Keep NPO after midnight with IV fluids  -  Case discussed with Dr. Coffey

## 2022-04-27 ENCOUNTER — TRANSCRIPTION ENCOUNTER (OUTPATIENT)
Age: 32
End: 2022-04-27

## 2022-04-27 LAB
ANION GAP SERPL CALC-SCNC: 4 MMOL/L — LOW (ref 5–17)
BUN SERPL-MCNC: 17 MG/DL — SIGNIFICANT CHANGE UP (ref 7–18)
CALCIUM SERPL-MCNC: 9.6 MG/DL — SIGNIFICANT CHANGE UP (ref 8.4–10.5)
CHLORIDE SERPL-SCNC: 105 MMOL/L — SIGNIFICANT CHANGE UP (ref 96–108)
CO2 SERPL-SCNC: 30 MMOL/L — SIGNIFICANT CHANGE UP (ref 22–31)
CREAT SERPL-MCNC: 1.28 MG/DL — SIGNIFICANT CHANGE UP (ref 0.5–1.3)
EGFR: 77 ML/MIN/1.73M2 — SIGNIFICANT CHANGE UP
GLUCOSE SERPL-MCNC: 107 MG/DL — HIGH (ref 70–99)
HCT VFR BLD CALC: 50.8 % — HIGH (ref 39–50)
HGB BLD-MCNC: 16.6 G/DL — SIGNIFICANT CHANGE UP (ref 13–17)
MCHC RBC-ENTMCNC: 26.5 PG — LOW (ref 27–34)
MCHC RBC-ENTMCNC: 32.7 GM/DL — SIGNIFICANT CHANGE UP (ref 32–36)
MCV RBC AUTO: 81.2 FL — SIGNIFICANT CHANGE UP (ref 80–100)
NRBC # BLD: 0 /100 WBCS — SIGNIFICANT CHANGE UP (ref 0–0)
PLATELET # BLD AUTO: 293 K/UL — SIGNIFICANT CHANGE UP (ref 150–400)
POTASSIUM SERPL-MCNC: 4.7 MMOL/L — SIGNIFICANT CHANGE UP (ref 3.5–5.3)
POTASSIUM SERPL-SCNC: 4.7 MMOL/L — SIGNIFICANT CHANGE UP (ref 3.5–5.3)
RBC # BLD: 6.26 M/UL — HIGH (ref 4.2–5.8)
RBC # FLD: 12.8 % — SIGNIFICANT CHANGE UP (ref 10.3–14.5)
SODIUM SERPL-SCNC: 139 MMOL/L — SIGNIFICANT CHANGE UP (ref 135–145)
WBC # BLD: 8.77 K/UL — SIGNIFICANT CHANGE UP (ref 3.8–10.5)
WBC # FLD AUTO: 8.77 K/UL — SIGNIFICANT CHANGE UP (ref 3.8–10.5)

## 2022-04-27 DEVICE — IMPLANTABLE DEVICE: Type: IMPLANTABLE DEVICE | Site: LEFT,   RIGHT | Status: FUNCTIONAL

## 2022-04-27 RX ORDER — ACETAMINOPHEN 500 MG
1000 TABLET ORAL ONCE
Refills: 0 | Status: DISCONTINUED | OUTPATIENT
Start: 2022-04-27 | End: 2022-04-27

## 2022-04-27 RX ORDER — FENTANYL CITRATE 50 UG/ML
25 INJECTION INTRAVENOUS
Refills: 0 | Status: DISCONTINUED | OUTPATIENT
Start: 2022-04-27 | End: 2022-04-27

## 2022-04-27 RX ORDER — SODIUM CHLORIDE 9 MG/ML
1000 INJECTION, SOLUTION INTRAVENOUS
Refills: 0 | Status: DISCONTINUED | OUTPATIENT
Start: 2022-04-27 | End: 2022-04-27

## 2022-04-27 RX ORDER — ONDANSETRON 8 MG/1
4 TABLET, FILM COATED ORAL ONCE
Refills: 0 | Status: DISCONTINUED | OUTPATIENT
Start: 2022-04-27 | End: 2022-04-27

## 2022-04-27 RX ORDER — CEFAZOLIN SODIUM 1 G
2000 VIAL (EA) INJECTION EVERY 8 HOURS
Refills: 0 | Status: COMPLETED | OUTPATIENT
Start: 2022-04-28 | End: 2022-04-28

## 2022-04-27 RX ORDER — FOLIC ACID 0.8 MG
1 TABLET ORAL DAILY
Refills: 0 | Status: DISCONTINUED | OUTPATIENT
Start: 2022-04-27 | End: 2022-05-02

## 2022-04-27 RX ORDER — ASCORBIC ACID 60 MG
500 TABLET,CHEWABLE ORAL
Refills: 0 | Status: DISCONTINUED | OUTPATIENT
Start: 2022-04-27 | End: 2022-05-02

## 2022-04-27 RX ORDER — TRAMADOL HYDROCHLORIDE 50 MG/1
50 TABLET ORAL EVERY 8 HOURS
Refills: 0 | Status: DISCONTINUED | OUTPATIENT
Start: 2022-04-27 | End: 2022-05-02

## 2022-04-27 RX ORDER — ENOXAPARIN SODIUM 100 MG/ML
30 INJECTION SUBCUTANEOUS EVERY 12 HOURS
Refills: 0 | Status: DISCONTINUED | OUTPATIENT
Start: 2022-04-28 | End: 2022-05-02

## 2022-04-27 RX ORDER — ENOXAPARIN SODIUM 100 MG/ML
30 INJECTION SUBCUTANEOUS EVERY 12 HOURS
Refills: 0 | Status: DISCONTINUED | OUTPATIENT
Start: 2022-04-27 | End: 2022-04-27

## 2022-04-27 RX ORDER — CELECOXIB 200 MG/1
200 CAPSULE ORAL DAILY
Refills: 0 | Status: DISCONTINUED | OUTPATIENT
Start: 2022-04-27 | End: 2022-05-02

## 2022-04-27 RX ORDER — HYDROMORPHONE HYDROCHLORIDE 2 MG/ML
0.5 INJECTION INTRAMUSCULAR; INTRAVENOUS; SUBCUTANEOUS
Refills: 0 | Status: DISCONTINUED | OUTPATIENT
Start: 2022-04-27 | End: 2022-04-27

## 2022-04-27 RX ORDER — OXYCODONE HYDROCHLORIDE 5 MG/1
5 TABLET ORAL EVERY 4 HOURS
Refills: 0 | Status: DISCONTINUED | OUTPATIENT
Start: 2022-04-27 | End: 2022-04-28

## 2022-04-27 RX ORDER — FAMOTIDINE 10 MG/ML
20 INJECTION INTRAVENOUS EVERY 12 HOURS
Refills: 0 | Status: DISCONTINUED | OUTPATIENT
Start: 2022-04-27 | End: 2022-05-02

## 2022-04-27 RX ADMIN — MUPIROCIN 1 APPLICATION(S): 20 OINTMENT TOPICAL at 06:24

## 2022-04-27 RX ADMIN — CHLORHEXIDINE GLUCONATE 1 APPLICATION(S): 213 SOLUTION TOPICAL at 06:24

## 2022-04-27 RX ADMIN — HYDROMORPHONE HYDROCHLORIDE 0.5 MILLIGRAM(S): 2 INJECTION INTRAMUSCULAR; INTRAVENOUS; SUBCUTANEOUS at 19:05

## 2022-04-27 RX ADMIN — FENTANYL CITRATE 25 MICROGRAM(S): 50 INJECTION INTRAVENOUS at 19:59

## 2022-04-27 RX ADMIN — FENTANYL CITRATE 25 MICROGRAM(S): 50 INJECTION INTRAVENOUS at 20:42

## 2022-04-27 RX ADMIN — HYDROMORPHONE HYDROCHLORIDE 0.5 MILLIGRAM(S): 2 INJECTION INTRAMUSCULAR; INTRAVENOUS; SUBCUTANEOUS at 19:16

## 2022-04-27 RX ADMIN — HYDROMORPHONE HYDROCHLORIDE 0.5 MILLIGRAM(S): 2 INJECTION INTRAMUSCULAR; INTRAVENOUS; SUBCUTANEOUS at 18:44

## 2022-04-27 NOTE — DISCHARGE NOTE PROVIDER - NSDCMRMEDTOKEN_GEN_ALL_CORE_FT
celecoxib 200 mg oral capsule: 1 cap(s) orally once a day  enoxaparin: 40 milligram(s) injectable once a day  famotidine 20 mg oral tablet: 1 tab(s) orally every 12 hours  oxyCODONE 10 mg oral tablet: 1 tab(s) orally every 4 hours, As needed, Severe Pain (7 - 10)  polyethylene glycol 3350 oral powder for reconstitution: 17 gram(s) orally once a day  senna oral tablet: 2 tab(s) orally once a day (at bedtime)  traMADol 50 mg oral tablet: 1 tab(s) orally every 8 hours, As Needed - for moderate pain   acetaminophen 500 mg oral tablet: 2 tab(s) orally every 8 hours, As needed, Moderate Pain (4 - 6)  ascorbic acid 500 mg oral tablet: 1 tab(s) orally 2 times a day  celecoxib 200 mg oral capsule: 1 cap(s) orally once a day  enoxaparin: 40 milligram(s) injectable once a day unti 5/11/2022  famotidine 20 mg oral tablet: 1 tab(s) orally every 12 hours  folic acid 1 mg oral tablet: 1 tab(s) orally once a day  Multiple Vitamins oral tablet: 1 tab(s) orally once a day  mupirocin 2% topical ointment: Apply topically to affected area 2 times a day for 4 days  oxyCODONE 10 mg oral tablet: 1 tab(s) orally every 4 hours, As needed, Severe Pain (7 - 10)  polyethylene glycol 3350 oral powder for reconstitution: 17 gram(s) orally once a day  senna oral tablet: 2 tab(s) orally once a day (at bedtime)  traMADol 50 mg oral tablet: 1 tab(s) orally every 8 hours, As Needed - for moderate pain

## 2022-04-27 NOTE — BRIEF OPERATIVE NOTE - NSICDXBRIEFPOSTOP_GEN_ALL_CORE_FT
POST-OP DIAGNOSIS:  Rupture of left patellar tendon 27-Apr-2022 17:30:22  Puma Yeager  Rupture of right patellar tendon 27-Apr-2022 17:30:20  Puma Yeager

## 2022-04-27 NOTE — DISCHARGE NOTE PROVIDER - HOSPITAL COURSE
30 yo m no PMHx and PSHx of right knee ORIF presented with bilateral knee pain. s/p fell onto his knees while playing basketball.  CT revealed concern for patellar ligament rupture in L knee and fractured hardware in R knee with small hematoma. Plan for surgery Wednesday for b/l tendon reconstruction.  Ortho following. 30 yo m no PMHx and PSHx of right knee ORIF presented with bilateral knee pain   s/p fell onto his knees while playing basketball.  CT revealed concern for patellar ligament rupture in L knee and fractured hardware   in R knee with small hematoma. Ortho team consulted. Pt underwent bilateral knee tendon reconstruction 4/27.  Pt was evaluated by PT with recommendation for acute rehab  Eva was well controlled with prescribed regimen  Given patient's improved clinical status and current hemodynamic stability, decision was made to discharge.

## 2022-04-27 NOTE — DISCHARGE NOTE PROVIDER - NSDCCPTREATMENT_GEN_ALL_CORE_FT
PRINCIPAL PROCEDURE  Procedure: Repair of patellar tendon  Findings and Treatment: BILATERAL REPAIR  REMOVAL OF HARDWARE RIGHT KNEE  Pain Management  DVT Prophylaxis with Venodynes & Lovenox  sutures are absorbable and do not need to be removed  patient dressing needs to be changed on 5/3/22. If changing with waterproof dressing, can change every 4-5 days. If changing with non-waterproof dressing, change every 2-3 days  D/c Lovenox on 5/11/22  Daily PT- WBAT with approrpriate assitive device BLLE braces locked in full extension  Follow up with Dr Domitila Coffey, Orthopedic surgeon, after discharge at: [phone]      SECONDARY PROCEDURE  Procedure: Removal of foreign body from right knee  Findings and Treatment:

## 2022-04-27 NOTE — PROGRESS NOTE ADULT - SUBJECTIVE AND OBJECTIVE BOX
Patient is a 31y old  Male who presents with a chief complaint of b/l knee pain    INTERVAL HPI/OVERNIGHT EVENTS: no overnight events    Vital Signs Last 24 Hrs  T(C): 36.6 (27 Apr 2022 04:59), Max: 37.3 (26 Apr 2022 21:10)  T(F): 97.8 (27 Apr 2022 04:59), Max: 99.2 (26 Apr 2022 21:10)  HR: 66 (27 Apr 2022 04:59) (66 - 86)  BP: 133/90 (27 Apr 2022 04:59) (133/90 - 150/90)  BP(mean): --  RR: 16 (27 Apr 2022 04:59) (16 - 20)  SpO2: 97% (27 Apr 2022 04:59) (97% - 99%)        PAST MEDICAL & SURGICAL HISTORY:  History of open reduction and internal fixation (ORIF) procedure  Right knee            SOCIAL HISTORY  Alcohol:  Tobacco:  Illicit substance use:      FAMILY HISTORY:          LABS:                        15.9   9.32  )-----------( 269      ( 26 Apr 2022 06:50 )             49.4     04-26    140  |  105  |  17  ----------------------------<  103<H>  4.0   |  28  |  1.38<H>    Ca    9.2      26 Apr 2022 06:50      PT/INR - ( 26 Apr 2022 06:50 )   PT: 12.6 sec;   INR: 1.06 ratio         PTT - ( 26 Apr 2022 06:50 )  PTT:30.4 sec    CAPILLARY BLOOD GLUCOSE                MEDICATIONS  (STANDING):  chlorhexidine 2% Cloths 1 Application(s) Topical every 12 hours  mupirocin 2% Ointment 1 Application(s) Both Nostrils two times a day  polyethylene glycol 3350 17 Gram(s) Oral daily    MEDICATIONS  (PRN):  acetaminophen     Tablet .. 650 milliGRAM(s) Oral every 4 hours PRN Mild Pain (1 - 3)  ondansetron    Tablet 4 milliGRAM(s) Oral every 6 hours PRN Nausea and/or Vomiting  oxycodone    5 mG/acetaminophen 325 mG 1 Tablet(s) Oral every 4 hours PRN Moderate Pain (4 - 6)  oxycodone    5 mG/acetaminophen 325 mG 2 Tablet(s) Oral every 6 hours PRN Severe Pain (7 - 10)      REVIEW OF SYSTEMS:  CONSTITUTIONAL: No fever, weight loss, or fatigue  EYES: No eye pain, visual disturbances, or discharge  ENMT:  No difficulty hearing, tinnitus, vertigo; No sinus or throat pain  NECK: No pain or stiffness  RESPIRATORY: No cough, wheezing, chills or hemoptysis; No shortness of breath  CARDIOVASCULAR: No chest pain, palpitations, dizziness, or leg swelling  GASTROINTESTINAL: No abdominal or epigastric pain. No nausea, vomiting, or hematemesis; No diarrhea or constipation. No melena or hematochezia.  GENITOURINARY: No dysuria, frequency, hematuria, or incontinence  NEUROLOGICAL: No headaches, memory loss, loss of strength, numbness, or tremors  SKIN: No itching, burning, rashes, or lesions   LYMPH NODES: No enlarged glands  ENDOCRINE: No heat or cold intolerance; No hair loss  MUSCULOSKELETAL: No joint pain or swelling; No muscle, back, or extremity pain  PSYCHIATRIC: No depression, anxiety, mood swings, or difficulty sleeping  HEME/LYMPH: No easy bruising, or bleeding gums  ALLERY AND IMMUNOLOGIC: No hives or eczema    RADIOLOGY & ADDITIONAL TESTS:< from: Xray Knee 3 Views, Bilateral (04.24.22 @ 13:42) >  CC: [id number] EXAM:  XR KNEE AP LAT OBL 3 VIEWS BI                          PROCEDURE DATE:  04/24/2022          INTERPRETATION:  Clinical history: 31-year-old male, fractures.    Three views of each knee are correlated with a concurrent CT.    FINDINGS: No acute fracture, dislocation or suprapatellar effusion. Prior   repair of the right extensor mechanism using cerclage wires.    IMPRESSION:  No acute radiographic findings, CT performed    --- End of Report ---            ALEKSANDAR SILVER DO; Attending Radiologist  This document has been electronically signed. Apr 25 2022 12:30PM    < end of copied text >  < from: CT Knee No Cont, Bilateral (04.24.22 @ 14:07) >    ACC: [id number] EXAM:  CT 3D RECONSTRUCT CHELE MARTÍNEZ                        ACC: [id number] EXAM:  CT KNEE ONLY BI                          PROCEDURE DATE:  04/24/2022          INTERPRETATION:  CT OF THE BILATERAL KNEES    CLINICAL INFORMATION: Bilateral knee pain after playing basketball.   Evaluate for bilateral knee fractures.  TECHNIQUE: Multidetector CT of the bilateral knees. The study was   performed without the use of intravenous or intra-articular contrast.   Multiplanar reformats were generated for review. Three dimensional   reconstructions were obtained on an independent workstation. The   interpretation of these images is included in the body of the report of   the main portion of the study.    COMPARISON: Bilateral knee radiographs 4/24/2022.    FINDINGS:    RIGHT KNEE:    HARDWARE: Patient appears to be status post repair of the extensor   mechanism using cerclage wire. The wire is fractured in multiple places.    BONE: No acute fracture. No focal lytic or blastic lesions. Postsurgical   changes in the anterior aspect of the patella and tibia.    JOINTS: No dislocation. No knee joint effusion or lipohemarthrosis.   Cartilage spaces are maintained.    SOFT TISSUE: No focal muscle atrophy. Neurovascular structures are normal   in course and caliber. Edema in the subcutaneous fat along the anterior   aspect of the knee joint.      LEFT KNEE:    BONE: No acute fracture    JOINTS: No dislocation. No joint effusion or lipohemarthrosis.    SOFT TISSUE: Irregular appearance of the proximal patellar tendon   suspicious for tear. Mild amount of edema is seen in the surrounding   subcutaneous fat. No focal muscle atrophy. Neurovascular structures are   normal in course and caliber.      IMPRESSION:  1.  No acute fracture ordislocation.  2.  On the left, there is an irregular appearance of the proximal   patellar tendon concerning for patellar tendon tear. Consider   confirmation with ultrasound or MRI.  3.  Chronic postsurgical repair of the right extensor mechanism. Hardware   is fractured in multiple places, as described above.  4.  Edema in the subcutaneous fat along the anterior aspect of the right   knee suspicious for small hematoma.    --- End of Report ---            MONICO LIU MD; Attending Radiologist  This document has been electronically signed. Apr 24 2022  3:32PM    < end of copied text >  < from: CT 3D Reconstruct w/o Workstation (04.24.22 @ 14:07) >  ACC: [id number] EXAM:  CT 3D RECONSTRUCT  MAURICIO                        ACC: [id number] EXAM:  CT KNEE ONLY BI                          PROCEDURE DATE:  04/24/2022          INTERPRETATION:  CT OF THE BILATERAL KNEES    CLINICAL INFORMATION: Bilateral knee pain after playing basketball.   Evaluate for bilateral knee fractures.  TECHNIQUE: Multidetector CT of the bilateral knees. The study was   performed without the use of intravenous or intra-articular contrast.   Multiplanar reformats were generated for review. Three dimensional   reconstructions were obtained on an independent workstation. The   interpretation of these images is included in the body of the report of   the main portion of the study.    COMPARISON: Bilateral knee radiographs 4/24/2022.    FINDINGS:    RIGHT KNEE:    HARDWARE: Patient appears to be status post repair of the extensor   mechanism using cerclage wire. The wire is fractured in multiple places.    BONE: No acute fracture. No focal lytic or blastic lesions. Postsurgical   changes in the anterior aspect of the patella and tibia.    JOINTS: No dislocation. No knee joint effusion or lipohemarthrosis.   Cartilage spaces are maintained.    SOFT TISSUE: No focal muscle atrophy. Neurovascular structures are normal   in course and caliber. Edema in the subcutaneous fat along the anterior   aspect of the knee joint.      LEFT KNEE:    BONE: No acute fracture    JOINTS: No dislocation. No joint effusion or lipohemarthrosis.    SOFT TISSUE: Irregular appearance of the proximal patellar tendon   suspicious for tear. Mild amount of edema is seen in the surrounding   subcutaneous fat. No focal muscle atrophy. Neurovascular structures are   normal in course and caliber.      IMPRESSION:  1.  No acute fracture ordislocation.  2.  On the left, there is an irregular appearance of the proximal   patellar tendon concerning for patellar tendon tear. Consider   confirmation with ultrasound or MRI.  3.  Chronic postsurgical repair of the right extensor mechanism. Hardware   is fractured in multiple places, as described above.  4.  Edema in the subcutaneous fat along the anterior aspect of the right   knee suspicious for small hematoma.    --- End of Report ---            MONICO LIU MD; Attending Radiologist  This document has been electronically signed. Apr 24 2022  3:32PM    < end of copied text >      Imaging Personally Reviewed:  [x ] YES  [ ] NO    Consultant(s) Notes Reviewed:  [ x] YES  [ ] NO    PHYSICAL EXAM:  GENERAL: NAD  HEAD:  Atraumatic, Normocephalic  EYES:  conjunctiva and sclera clear  ENMT:  Moist mucous membranes, Good dentition  NECK: Supple, No JVD  NERVOUS SYSTEM:  Alert & Oriented X3, Good concentration  CHEST/LUNG: Clear to auscultation bilaterally; No rales, rhonchi, wheezing, or rubs  HEART: Regular rate and rhythm; No murmurs, rubs, or gallops  ABDOMEN: Soft, Nontender, Nondistended; Bowel sounds present  EXTREMITIES:  2+ Peripheral Pulses, No clubbing, cyanosis. + b/l knee edema  SKIN: No rashes or lesions    Care Collaborated Discussed with Consultants/Other Providers [ x] YES  [ ] NO

## 2022-04-27 NOTE — PROGRESS NOTE ADULT - SUBJECTIVE AND OBJECTIVE BOX
31yMale    Diagnosis:  Bilateral Patella Tendon Rupture    Patient was seen and evaluated at bedside. Patient with no acute complaints.   Pain is well controlled.   Denies CP/SOB, dyspnea, paresthesias, N/V/D, palpitations.     Vital Signs Last 24 Hrs  T(C): 36.6 (27 Apr 2022 04:59), Max: 37.3 (26 Apr 2022 21:10)  T(F): 97.8 (27 Apr 2022 04:59), Max: 99.2 (26 Apr 2022 21:10)  HR: 66 (27 Apr 2022 04:59) (66 - 86)  BP: 133/90 (27 Apr 2022 04:59) (133/90 - 150/90)  BP(mean): --  RR: 16 (27 Apr 2022 04:59) (16 - 20)  SpO2: 97% (27 Apr 2022 04:59) (97% - 99%)    Physical Exam:    General: AAOx3, NAD, resting comfortably in bed.    L Knee: Knee immobilizer in place. Skin intact. Swelling noted. Calves soft and NT. ROM of toes and ankle plantarflexion/dorsiflexion. NVI.  R Knee: Knee immobilizer in place. Skin intact. Swelling noted. Calves soft and NT. ROM of toes and ankle plantarflexion/dorsiflexion. NVI.                          15.9   9.32  )-----------( 269      ( 26 Apr 2022 06:50 )             49.4     04-26    140  |  105  |  17  ----------------------------<  103<H>  4.0   |  28  |  1.38<H>    Ca    9.2      26 Apr 2022 06:50        Impression:  31yMale Bilateral Patella Tendon Rupture  Plan:  -  Pain management  -  Dvt prophylaxis  -  For OR (Bilateral Patella Tendon Rupture Repair) today   -  Obtain consent for surgery  -  Keep NPO with IV fluids  -  Case discussed with Dr. Coffey

## 2022-04-27 NOTE — PROGRESS NOTE ADULT - NS ATTEND AMEND GEN_ALL_CORE FT
Plan: Open repair odalys patellar tendon avulsion, removal hardware this afternoon.  He signed the consent, understanding the risks: infection, rerupture, blood clot, etc...

## 2022-04-27 NOTE — DISCHARGE NOTE PROVIDER - NSDCCPCAREPLAN_GEN_ALL_CORE_FT
PRINCIPAL DISCHARGE DIAGNOSIS  Diagnosis: Rupture of left patellar tendon  Assessment and Plan of Treatment: You presented with bilateral knee pain and ambulatory dysfunction.  Imaging of the knees showed, pateller tendon rupture. You were seen by orthopedics. On 4/27/22 you went to the OR for paterllar tendon repair.   Please take your pain medications as prescribed. Follow-up with  Orthopedics in 2 weeks.      SECONDARY DISCHARGE DIAGNOSES  Diagnosis: Ambulatory dysfunction  Assessment and Plan of Treatment:      PRINCIPAL DISCHARGE DIAGNOSIS  Diagnosis: Rupture of left patellar tendon  Assessment and Plan of Treatment: You presented with bilateral knee pain and ambulatory dysfunction.  Imaging of the knees showed, pateller tendon rupture. You were seen by orthopedics. On 4/27/22 you went to the OR for paterllar tendon repair.   Please take your pain medications as prescribed. Follow-up with  Orthopedics in 2 weeks.  Pain Management  DVT Prophylaxis with Venodynes & Lovenox  sutures are absorbable and do not need to be removed  patient dressing needs to be changed on 5/3/22. If changing with waterproof dressing, can change every 4-5 days. If changing with non-waterproof dressing, change every 2-3 days  D/c Lovenox on 5/11/22  Daily PT- WBAT with approrpriate assitive device BLLE braces locked in full extension  Follow up with Dr Domitila Coffey, Orthopedic surgeon, after discharge at: [phone]      SECONDARY DISCHARGE DIAGNOSES  Diagnosis: Ambulatory dysfunction  Assessment and Plan of Treatment:      PRINCIPAL DISCHARGE DIAGNOSIS  Diagnosis: Rupture of left patellar tendon  Assessment and Plan of Treatment: You presented with bilateral knee pain and ambulatory dysfunction.  Imaging of the knees showed, pateller tendon rupture. You were seen by orthopedics. On 4/27/22 you went to the OR for paterllar tendon repair.   Please take your pain medications as prescribed. Follow-up with  Orthopedics in 2 weeks.  Pain Management  DVT Prophylaxis with Venodynes & Lovenox  sutures are absorbable and do not need to be removed  patient dressing needs to be changed on 5/3/22. If changing with waterproof dressing, can change every 4-5 days. If changing with non-waterproof dressing, change every 2-3 days  Cont  Lovenox on 5/11/22  Daily PT- WBAT with approrpriate assitive device BLLE braces locked in full extension  Follow up with Dr Domitila Coffey, Orthopedic surgeon, after discharge at: [phone]

## 2022-04-27 NOTE — PROGRESS NOTE ADULT - ASSESSMENT
32 yo m no PMHx and PSHx of right knee ORIF presented with bilateral knee pain. s/p fell onto his knees while playing basketball.  CT revealed concern for patellar ligament rupture in L knee and fractured hardware in R knee with small hematoma. Plan for surgery Wednesday for b/l tendon reconstruction.  Ortho following.

## 2022-04-27 NOTE — BRIEF OPERATIVE NOTE - NSICDXBRIEFPREOP_GEN_ALL_CORE_FT
PRE-OP DIAGNOSIS:  Rupture of right patellar tendon 27-Apr-2022 17:30:03  Puma Yeager  Rupture of left patellar tendon 27-Apr-2022 17:30:14  Puma Yeager

## 2022-04-27 NOTE — DISCHARGE NOTE PROVIDER - NSDCACTIVITY_GEN_ALL_CORE
Follow Instructions Provided by your Surgical Team Do not drive or operate machinery/No heavy lifting/straining/Follow Instructions Provided by your Surgical Team

## 2022-04-27 NOTE — DISCHARGE NOTE PROVIDER - NSFOLLOWUPCLINICS_GEN_ALL_ED_FT
[facility]  Orthopedics  [address] [place] [address]  Phone: [phone]  Fax: [phone]  Follow Up Time: 2 weeks

## 2022-04-27 NOTE — DISCHARGE NOTE PROVIDER - CARE PROVIDER_API CALL
Domitila Coffey)  Orthopaedic Surgery  [address] [place] [address], [address]  [place] [address]  Phone: [phone]  Fax: [phone]  Follow Up Time: 2 weeks

## 2022-04-27 NOTE — PACU DISCHARGE NOTE - PAIN:
Controlled with current regime Mucosal Advancement Flap Text: Given the location of the defect, shape of the defect and the proximity to free margins a mucosal advancement flap was deemed most appropriate. Incisions were made with a 15 blade scalpel in the appropriate fashion along the cutaneous vermilion border and the mucosal lip. The remaining actinically damaged mucosal tissue was excised.  The mucosal advancement flap was then elevated to the gingival sulcus with care taken to preserve the neurovascular structures and advanced into the primary defect. Care was taken to ensure that precise realignment of the vermilion border was achieved.

## 2022-04-28 DIAGNOSIS — S86.812A STRAIN OF OTHER MUSCLE(S) AND TENDON(S) AT LOWER LEG LEVEL, LEFT LEG, INITIAL ENCOUNTER: ICD-10-CM

## 2022-04-28 DIAGNOSIS — S86.811A STRAIN OF OTHER MUSCLE(S) AND TENDON(S) AT LOWER LEG LEVEL, RIGHT LEG, INITIAL ENCOUNTER: ICD-10-CM

## 2022-04-28 LAB
ANION GAP SERPL CALC-SCNC: 9 MMOL/L — SIGNIFICANT CHANGE UP (ref 5–17)
BASOPHILS # BLD AUTO: 0.02 K/UL — SIGNIFICANT CHANGE UP (ref 0–0.2)
BASOPHILS NFR BLD AUTO: 0.1 % — SIGNIFICANT CHANGE UP (ref 0–2)
BUN SERPL-MCNC: 20 MG/DL — HIGH (ref 7–18)
CALCIUM SERPL-MCNC: 9 MG/DL — SIGNIFICANT CHANGE UP (ref 8.4–10.5)
CHLORIDE SERPL-SCNC: 106 MMOL/L — SIGNIFICANT CHANGE UP (ref 96–108)
CO2 SERPL-SCNC: 24 MMOL/L — SIGNIFICANT CHANGE UP (ref 22–31)
CREAT SERPL-MCNC: 1.32 MG/DL — HIGH (ref 0.5–1.3)
EGFR: 74 ML/MIN/1.73M2 — SIGNIFICANT CHANGE UP
EOSINOPHIL # BLD AUTO: 0.02 K/UL — SIGNIFICANT CHANGE UP (ref 0–0.5)
EOSINOPHIL NFR BLD AUTO: 0.1 % — SIGNIFICANT CHANGE UP (ref 0–6)
GLUCOSE SERPL-MCNC: 112 MG/DL — HIGH (ref 70–99)
HCT VFR BLD CALC: 45.9 % — SIGNIFICANT CHANGE UP (ref 39–50)
HGB BLD-MCNC: 15.3 G/DL — SIGNIFICANT CHANGE UP (ref 13–17)
IMM GRANULOCYTES NFR BLD AUTO: 0.4 % — SIGNIFICANT CHANGE UP (ref 0–1.5)
LYMPHOCYTES # BLD AUTO: 1.83 K/UL — SIGNIFICANT CHANGE UP (ref 1–3.3)
LYMPHOCYTES # BLD AUTO: 11.4 % — LOW (ref 13–44)
MCHC RBC-ENTMCNC: 26.4 PG — LOW (ref 27–34)
MCHC RBC-ENTMCNC: 33.3 GM/DL — SIGNIFICANT CHANGE UP (ref 32–36)
MCV RBC AUTO: 79.1 FL — LOW (ref 80–100)
MONOCYTES # BLD AUTO: 1.37 K/UL — HIGH (ref 0–0.9)
MONOCYTES NFR BLD AUTO: 8.5 % — SIGNIFICANT CHANGE UP (ref 2–14)
NEUTROPHILS # BLD AUTO: 12.72 K/UL — HIGH (ref 1.8–7.4)
NEUTROPHILS NFR BLD AUTO: 79.5 % — HIGH (ref 43–77)
NRBC # BLD: 0 /100 WBCS — SIGNIFICANT CHANGE UP (ref 0–0)
PLATELET # BLD AUTO: 332 K/UL — SIGNIFICANT CHANGE UP (ref 150–400)
POTASSIUM SERPL-MCNC: 3.8 MMOL/L — SIGNIFICANT CHANGE UP (ref 3.5–5.3)
POTASSIUM SERPL-SCNC: 3.8 MMOL/L — SIGNIFICANT CHANGE UP (ref 3.5–5.3)
RBC # BLD: 5.8 M/UL — SIGNIFICANT CHANGE UP (ref 4.2–5.8)
RBC # FLD: 12.8 % — SIGNIFICANT CHANGE UP (ref 10.3–14.5)
SODIUM SERPL-SCNC: 139 MMOL/L — SIGNIFICANT CHANGE UP (ref 135–145)
WBC # BLD: 16.03 K/UL — HIGH (ref 3.8–10.5)
WBC # FLD AUTO: 16.03 K/UL — HIGH (ref 3.8–10.5)

## 2022-04-28 PROCEDURE — 99222 1ST HOSP IP/OBS MODERATE 55: CPT

## 2022-04-28 RX ORDER — OXYCODONE HYDROCHLORIDE 5 MG/1
10 TABLET ORAL EVERY 4 HOURS
Refills: 0 | Status: DISCONTINUED | OUTPATIENT
Start: 2022-04-28 | End: 2022-05-02

## 2022-04-28 RX ORDER — ACETAMINOPHEN 500 MG
1000 TABLET ORAL EVERY 6 HOURS
Refills: 0 | Status: COMPLETED | OUTPATIENT
Start: 2022-04-28 | End: 2022-05-01

## 2022-04-28 RX ORDER — HYDROMORPHONE HYDROCHLORIDE 2 MG/ML
1 INJECTION INTRAMUSCULAR; INTRAVENOUS; SUBCUTANEOUS ONCE
Refills: 0 | Status: DISCONTINUED | OUTPATIENT
Start: 2022-04-28 | End: 2022-04-28

## 2022-04-28 RX ORDER — SENNA PLUS 8.6 MG/1
2 TABLET ORAL AT BEDTIME
Refills: 0 | Status: DISCONTINUED | OUTPATIENT
Start: 2022-04-28 | End: 2022-05-02

## 2022-04-28 RX ORDER — HYDROMORPHONE HYDROCHLORIDE 2 MG/ML
0.5 INJECTION INTRAMUSCULAR; INTRAVENOUS; SUBCUTANEOUS ONCE
Refills: 0 | Status: DISCONTINUED | OUTPATIENT
Start: 2022-04-28 | End: 2022-04-28

## 2022-04-28 RX ADMIN — OXYCODONE HYDROCHLORIDE 5 MILLIGRAM(S): 5 TABLET ORAL at 09:19

## 2022-04-28 RX ADMIN — OXYCODONE HYDROCHLORIDE 5 MILLIGRAM(S): 5 TABLET ORAL at 03:15

## 2022-04-28 RX ADMIN — CELECOXIB 200 MILLIGRAM(S): 200 CAPSULE ORAL at 12:21

## 2022-04-28 RX ADMIN — Medication 1000 MILLIGRAM(S): at 18:06

## 2022-04-28 RX ADMIN — Medication 500 MILLIGRAM(S): at 05:41

## 2022-04-28 RX ADMIN — Medication 500 MILLIGRAM(S): at 18:08

## 2022-04-28 RX ADMIN — HYDROMORPHONE HYDROCHLORIDE 0.5 MILLIGRAM(S): 2 INJECTION INTRAMUSCULAR; INTRAVENOUS; SUBCUTANEOUS at 12:29

## 2022-04-28 RX ADMIN — ENOXAPARIN SODIUM 30 MILLIGRAM(S): 100 INJECTION SUBCUTANEOUS at 18:08

## 2022-04-28 RX ADMIN — Medication 1000 MILLIGRAM(S): at 19:03

## 2022-04-28 RX ADMIN — OXYCODONE HYDROCHLORIDE 10 MILLIGRAM(S): 5 TABLET ORAL at 19:31

## 2022-04-28 RX ADMIN — Medication 1 MILLIGRAM(S): at 11:23

## 2022-04-28 RX ADMIN — Medication 1000 MILLIGRAM(S): at 13:49

## 2022-04-28 RX ADMIN — HYDROMORPHONE HYDROCHLORIDE 0.5 MILLIGRAM(S): 2 INJECTION INTRAMUSCULAR; INTRAVENOUS; SUBCUTANEOUS at 13:49

## 2022-04-28 RX ADMIN — Medication 1000 MILLIGRAM(S): at 12:28

## 2022-04-28 RX ADMIN — ENOXAPARIN SODIUM 30 MILLIGRAM(S): 100 INJECTION SUBCUTANEOUS at 05:41

## 2022-04-28 RX ADMIN — FAMOTIDINE 20 MILLIGRAM(S): 10 INJECTION INTRAVENOUS at 18:08

## 2022-04-28 RX ADMIN — Medication 100 MILLIGRAM(S): at 07:53

## 2022-04-28 RX ADMIN — Medication 1 TABLET(S): at 11:23

## 2022-04-28 RX ADMIN — TRAMADOL HYDROCHLORIDE 50 MILLIGRAM(S): 50 TABLET ORAL at 05:40

## 2022-04-28 RX ADMIN — Medication 100 MILLIGRAM(S): at 00:13

## 2022-04-28 RX ADMIN — TRAMADOL HYDROCHLORIDE 50 MILLIGRAM(S): 50 TABLET ORAL at 06:57

## 2022-04-28 RX ADMIN — TRAMADOL HYDROCHLORIDE 50 MILLIGRAM(S): 50 TABLET ORAL at 15:20

## 2022-04-28 RX ADMIN — OXYCODONE HYDROCHLORIDE 5 MILLIGRAM(S): 5 TABLET ORAL at 07:52

## 2022-04-28 RX ADMIN — TRAMADOL HYDROCHLORIDE 50 MILLIGRAM(S): 50 TABLET ORAL at 21:56

## 2022-04-28 RX ADMIN — OXYCODONE HYDROCHLORIDE 10 MILLIGRAM(S): 5 TABLET ORAL at 21:56

## 2022-04-28 RX ADMIN — TRAMADOL HYDROCHLORIDE 50 MILLIGRAM(S): 50 TABLET ORAL at 15:50

## 2022-04-28 RX ADMIN — OXYCODONE HYDROCHLORIDE 5 MILLIGRAM(S): 5 TABLET ORAL at 04:24

## 2022-04-28 RX ADMIN — HYDROMORPHONE HYDROCHLORIDE 1 MILLIGRAM(S): 2 INJECTION INTRAMUSCULAR; INTRAVENOUS; SUBCUTANEOUS at 11:05

## 2022-04-28 RX ADMIN — CELECOXIB 200 MILLIGRAM(S): 200 CAPSULE ORAL at 11:22

## 2022-04-28 RX ADMIN — HYDROMORPHONE HYDROCHLORIDE 1 MILLIGRAM(S): 2 INJECTION INTRAMUSCULAR; INTRAVENOUS; SUBCUTANEOUS at 09:40

## 2022-04-28 RX ADMIN — FAMOTIDINE 20 MILLIGRAM(S): 10 INJECTION INTRAVENOUS at 05:41

## 2022-04-28 RX ADMIN — TRAMADOL HYDROCHLORIDE 50 MILLIGRAM(S): 50 TABLET ORAL at 22:45

## 2022-04-28 NOTE — PROGRESS NOTE ADULT - SUBJECTIVE AND OBJECTIVE BOX
NP Note discussed with  primary attending    Patient is a 31y old  Male who presents with a chief complaint of b/l knee patellar tendon rupture (27 Apr 2022 18:31)      INTERVAL HPI/OVERNIGHT EVENTS: no new complaints    MEDICATIONS  (STANDING):  acetaminophen     Tablet .. 1000 milliGRAM(s) Oral every 6 hours  ascorbic acid 500 milliGRAM(s) Oral two times a day  celecoxib 200 milliGRAM(s) Oral daily  enoxaparin Injectable 30 milliGRAM(s) SubCutaneous every 12 hours  famotidine    Tablet 20 milliGRAM(s) Oral every 12 hours  folic acid 1 milliGRAM(s) Oral daily  multivitamin 1 Tablet(s) Oral daily  polyethylene glycol 3350 17 Gram(s) Oral daily  senna 2 Tablet(s) Oral at bedtime  traMADol 50 milliGRAM(s) Oral every 8 hours    MEDICATIONS  (PRN):  oxyCODONE    IR 10 milliGRAM(s) Oral every 4 hours PRN Severe Pain (7 - 10)      __________________________________________________  REVIEW OF SYSTEMS:    CONSTITUTIONAL: No fever,   EYES: no acute visual disturbances  NECK: No pain or stiffness  RESPIRATORY: No cough; No shortness of breath  CARDIOVASCULAR: No chest pain, no palpitations  GASTROINTESTINAL: No pain. No nausea or vomiting; No diarrhea   NEUROLOGICAL: No headache or numbness, no tremors  MUSCULOSKELETAL: + pain maynor knee, no joint pain, no muscle pain  GENITOURINARY: no dysuria, no frequency, no hesitancy  PSYCHIATRY: no depression , no anxiety  ALL OTHER  ROS negative        Vital Signs Last 24 Hrs  T(C): 36.9 (28 Apr 2022 05:30), Max: 37.1 (27 Apr 2022 20:45)  T(F): 98.5 (28 Apr 2022 05:30), Max: 98.7 (27 Apr 2022 20:45)  HR: 107 (28 Apr 2022 07:00) (85 - 115)  BP: 130/85 (28 Apr 2022 05:30) (129/81 - 172/94)  BP(mean): 111 (27 Apr 2022 20:45) (101 - 125)  RR: 17 (28 Apr 2022 07:00) (12 - 20)  SpO2: 98% (28 Apr 2022 07:00) (95% - 98%)    ________________________________________________  PHYSICAL EXAM:  GENERAL: NAD  HEENT: Normocephalic;  conjunctivae and sclerae clear; moist mucous membranes;   NECK : supple  CHEST/LUNG: Clear to auscultation bilaterally with good air entry   HEART: S1 S2  regular; no murmurs, gallops or rubs  ABDOMEN: Soft, Nontender, Nondistended; Bowel sounds present  EXTREMITIES: Maynor kneel immobilizer in place, +2 pedal pulses maynor, no cyanosis; no edema; no calf tenderness  SKIN: warm and dry; no rash  NERVOUS SYSTEM:  Awake and alert; Oriented  to place, person and time ; no new deficits    _________________________________________________  LABS:                        15.3   16.03 )-----------( 332      ( 28 Apr 2022 07:43 )             45.9     04-28    139  |  106  |  20<H>  ----------------------------<  112<H>  3.8   |  24  |  1.32<H>    Ca    9.0      28 Apr 2022 07:43          CAPILLARY BLOOD GLUCOSE            RADIOLOGY & ADDITIONAL TESTS:    Imaging Personally Reviewed:  YES/NO    Consultant(s) Notes Reviewed:   YES/ No    Care Discussed with Consultants :     Plan of care was discussed with patient and /or primary care giver; all questions and concerns were addressed and care was aligned with patient's wishes.     NP Note discussed with  primary attending    Patient is a 31y old  Male who presents with a chief complaint of b/l knee patellar tendon rupture (27 Apr 2022 18:31)      INTERVAL HPI/OVERNIGHT EVENTS: no new complaints    MEDICATIONS  (STANDING):  acetaminophen     Tablet .. 1000 milliGRAM(s) Oral every 6 hours  ascorbic acid 500 milliGRAM(s) Oral two times a day  celecoxib 200 milliGRAM(s) Oral daily  enoxaparin Injectable 30 milliGRAM(s) SubCutaneous every 12 hours  famotidine    Tablet 20 milliGRAM(s) Oral every 12 hours  folic acid 1 milliGRAM(s) Oral daily  multivitamin 1 Tablet(s) Oral daily  polyethylene glycol 3350 17 Gram(s) Oral daily  senna 2 Tablet(s) Oral at bedtime  traMADol 50 milliGRAM(s) Oral every 8 hours    MEDICATIONS  (PRN):  oxyCODONE    IR 10 milliGRAM(s) Oral every 4 hours PRN Severe Pain (7 - 10)      __________________________________________________  REVIEW OF SYSTEMS:    CONSTITUTIONAL: No fever,   EYES: no acute visual disturbances  NECK: No pain or stiffness  RESPIRATORY: No cough; No shortness of breath  CARDIOVASCULAR: No chest pain, no palpitations  GASTROINTESTINAL: No pain. No nausea or vomiting; No diarrhea   NEUROLOGICAL: No headache or numbness, no tremors  MUSCULOSKELETAL: C/O pain maynor knee, no joint pain, no muscle pain  GENITOURINARY: no dysuria, no frequency, no hesitancy  PSYCHIATRY: no depression , no anxiety  ALL OTHER  ROS negative        Vital Signs Last 24 Hrs  T(C): 36.9 (28 Apr 2022 05:30), Max: 37.1 (27 Apr 2022 20:45)  T(F): 98.5 (28 Apr 2022 05:30), Max: 98.7 (27 Apr 2022 20:45)  HR: 107 (28 Apr 2022 07:00) (85 - 115)  BP: 130/85 (28 Apr 2022 05:30) (129/81 - 172/94)  BP(mean): 111 (27 Apr 2022 20:45) (101 - 125)  RR: 17 (28 Apr 2022 07:00) (12 - 20)  SpO2: 98% (28 Apr 2022 07:00) (95% - 98%)    ________________________________________________  PHYSICAL EXAM:  GENERAL: NAD  HEENT: Normocephalic;  conjunctivae and sclerae clear; moist mucous membranes;   NECK : supple  CHEST/LUNG: Clear to auscultation bilaterally with good air entry   HEART: S1 S2  regular; no murmurs, gallops or rubs  ABDOMEN: Soft, Nontender, Nondistended; Bowel sounds present  EXTREMITIES: Maynor kneel immobilizer in place,  no cyanosis; no edema; no calf tenderness  PERIPHERAL VASCULAR: +2 pedal pulses bilaterally, good capillary refill.  SKIN: warm and dry; no rash  NERVOUS SYSTEM:  Awake and alert; Oriented  to place, person and time ; no new deficits    _________________________________________________  LABS:                        15.3   16.03 )-----------( 332      ( 28 Apr 2022 07:43 )             45.9     04-28    139  |  106  |  20<H>  ----------------------------<  112<H>  3.8   |  24  |  1.32<H>    Ca    9.0      28 Apr 2022 07:43          CAPILLARY BLOOD GLUCOSE            RADIOLOGY & ADDITIONAL TESTS:    Imaging Personally Reviewed:  YES/NO    Consultant(s) Notes Reviewed:   YES/ No    Care Discussed with Consultants :     Plan of care was discussed with patient and /or primary care giver; all questions and concerns were addressed and care was aligned with patient's wishes.

## 2022-04-28 NOTE — CONSULT NOTE ADULT - SUBJECTIVE AND OBJECTIVE BOX
Source of information: YANCI QURESHI, Chart review  Patient language: English  : n/a    HPI:  32 yo m no PMHx and PSHx of right knee ORIF is presenting with bilateral knee pain. Pt was playing basketball, attempted to jump but fell onto his knees. He experienced severe pain and was unable to ambulate. He denies head trauma, CP, SOB, dizziness, paresthesias or cold extremities.     In ED: CT shows concern for patellar ligament rupture in L knee and fractured hardware in R knee with small hematoma. Ortho was consulted by ED provider, no acute intervention at this time, will reassess in a.m. (24 Apr 2022 18:44)      Pt s/p b/l patellar tendon repair 4/27 POD #1. Pain consulted for b/l knee pain. Pt seen and examined at bedside. Pt laying in bed, reports b/l knee pain, right knee 6/10, left knee 8/10 and not tolerable. Reports current pain regimen is not alleviating his pain. Pt describes pain as aching, sharp, non- radiating, not alleviated by current pain medication, exacerbated by movement. Pt also reports right foot numbness/tingling. Pt tolerating PO diet. Denies lethargy, chest pain, SOB, abdominal pain, nausea, vomiting, constipation, itchiness. Patient stated goal for pain control: to be able to take deep breaths and turning in bed with tolerable pain control. Pt denies taking medications for pain at home.     PAST MEDICAL & SURGICAL HISTORY:  History of open reduction and internal fixation (ORIF) procedure  Right knee        FAMILY HISTORY:      Social History:   [X ] Denies ETOH use, illicit drug use and smoking    Allergies    No Known Allergies    MEDICATIONS  (STANDING):  acetaminophen     Tablet .. 1000 milliGRAM(s) Oral every 6 hours  ascorbic acid 500 milliGRAM(s) Oral two times a day  celecoxib 200 milliGRAM(s) Oral daily  enoxaparin Injectable 30 milliGRAM(s) SubCutaneous every 12 hours  famotidine    Tablet 20 milliGRAM(s) Oral every 12 hours  folic acid 1 milliGRAM(s) Oral daily  HYDROmorphone  Injectable 0.5 milliGRAM(s) IV Push once  multivitamin 1 Tablet(s) Oral daily  polyethylene glycol 3350 17 Gram(s) Oral daily  senna 2 Tablet(s) Oral at bedtime  traMADol 50 milliGRAM(s) Oral every 8 hours    MEDICATIONS  (PRN):  oxyCODONE    IR 10 milliGRAM(s) Oral every 4 hours PRN Severe Pain (7 - 10)      Vital Signs Last 24 Hrs  T(C): 36.9 (28 Apr 2022 05:30), Max: 37.1 (27 Apr 2022 20:45)  T(F): 98.5 (28 Apr 2022 05:30), Max: 98.7 (27 Apr 2022 20:45)  HR: 107 (28 Apr 2022 07:00) (85 - 115)  BP: 130/85 (28 Apr 2022 05:30) (129/81 - 172/94)  BP(mean): 111 (27 Apr 2022 20:45) (101 - 125)  RR: 17 (28 Apr 2022 07:00) (12 - 20)  SpO2: 98% (28 Apr 2022 07:00) (95% - 98%)    LABS: Reviewed.                          15.3   16.03 )-----------( 332      ( 28 Apr 2022 07:43 )             45.9     04-28    139  |  106  |  20<H>  ----------------------------<  112<H>  3.8   |  24  |  1.32<H>    Ca    9.0      28 Apr 2022 07:43            CAPILLARY BLOOD GLUCOSE        COVID-19 PCR: NotDetec (24 Apr 2022 13:34)      Radiology: Reviewed.   < from: CT Knee No Cont, Bilateral (04.24.22 @ 14:07) >    ACC: [id number] EXAM:  CT 3D RECONSTRUCT  MAURICIO                        ACC: [id number] EXAM:  CT KNEE ONLY BI                          PROCEDURE DATE:  04/24/2022          INTERPRETATION:  CT OF THE BILATERAL KNEES    CLINICAL INFORMATION: Bilateral knee pain after playing basketball.   Evaluate for bilateral knee fractures.  TECHNIQUE: Multidetector CT of the bilateral knees. The study was   performed without the use of intravenous or intra-articular contrast.   Multiplanar reformats were generated for review. Three dimensional   reconstructions were obtained on an independent workstation. The   interpretation of these images is included in the body of the report of   the main portion of the study.    COMPARISON: Bilateral knee radiographs 4/24/2022.    FINDINGS:    RIGHT KNEE:    HARDWARE: Patient appears to be status post repair of the extensor   mechanism using cerclage wire. The wire is fractured in multiple places.    BONE: No acute fracture. No focal lytic or blastic lesions. Postsurgical   changes in the anterior aspect of the patella and tibia.    JOINTS: No dislocation. No knee joint effusion or lipohemarthrosis.   Cartilage spaces are maintained.    SOFT TISSUE: No focal muscle atrophy. Neurovascular structures are normal   in course and caliber. Edema in the subcutaneous fat along the anterior   aspect of the knee joint.      LEFT KNEE:    BONE: No acute fracture    JOINTS: No dislocation. No joint effusion or lipohemarthrosis.    SOFT TISSUE: Irregular appearance of the proximal patellar tendon   suspicious for tear. Mild amount of edema is seen in the surrounding   subcutaneous fat. No focal muscle atrophy. Neurovascular structures are   normal in course and caliber.      IMPRESSION:  1.  No acute fracture ordislocation.  2.  On the left, there is an irregular appearance of the proximal   patellar tendon concerning for patellar tendon tear. Consider   confirmation with ultrasound or MRI.  3.  Chronic postsurgical repair of the right extensor mechanism. Hardware   is fractured in multiple places, as described above.  4.  Edema in the subcutaneous fat along the anterior aspect of the right   knee suspicious for small hematoma.    --- End of Report ---            MONICO LIU MD; Attending Radiologist  This document has been electronically signed. Apr 24 2022  3:32PM    < end of copied text >      ORT Score -   Family Hx of substance abuse	Female	      Male  Alcohol 	                                           1                     3  Illegal drugs	                                   2                     3  Rx drugs                                           4 	                  4  Personal Hx of substance abuse		  Alcohol 	                                          3	                  3  Illegal drugs                                     4	                  4  Rx drugs                                            5 	                  5  Age between 16- 45 years	           1                     1  hx preadolescent sexual abuse	   3 	                  0  Psychological disease		  ADD, OCD, bipolar, schizophrenia   2	          2  Depression                                           1 	          1  Total: 1    a score of 3 or lower indicates low risk for opioid abuse		  a score of 4-7 indicates moderate risk for opioid abuse		  a score of 8 or higher indicates high risk for opioid abuse    REVIEW OF SYSTEMS:  CONSTITUTIONAL: No fever or fatigue  HEENT:  No difficulty hearing, no change in vision  NECK: No pain or stiffness  RESPIRATORY: No cough, wheezing, chills or hemoptysis; No shortness of breath  CARDIOVASCULAR: No chest pain, palpitations, dizziness, or leg swelling  GASTROINTESTINAL: No loss of appetite, decreased PO intake. No abdominal or epigastric pain. No nausea, vomiting; No diarrhea or constipation.   GENITOURINARY: No dysuria, frequency, hematuria, retention or incontinence  MUSCULOSKELETAL: + b/l knee joint pain and swelling; No back pain, no upper or lower motor strength weakness, no saddle anesthesia, bowel/bladder incontinence, + hx fall   NEURO: No headaches, No numbness/tingling b/l LE, No weakness    PHYSICAL EXAM:  GENERAL:  Alert & Oriented X4, cooperative, NAD, Good concentration. Speech is clear.   RESPIRATORY: Respirations even and unlabored. Clear to auscultation bilaterally; No rales, rhonchi, wheezing, or rubs  CARDIOVASCULAR: Normal S1/S2, regular rate and rhythm; No murmurs, rubs, or gallops. No JVD.   GASTROINTESTINAL:  Soft, Nontender, Nondistended; Bowel sounds present  PERIPHERAL VASCULAR: Extremities warm with b/l knee edema. 2+ Peripheral Pulses, No cyanosis, No calf tenderness  MUSCULOSKELETAL: + b/l knee immobilizers in place. Motor Strength 5/5 B/L upper and lower extremities; + decreased b/l LE ROM + b/l knee pain tenderness on palpation   SKIN: Warm, dry, intact. No rashes, lesions, scars or wounds. + b/l knee ace wrap c/d/i    Risk factors associated with adverse outcomes related to opioid treatment  [ ]  Concurrent benzodiazepine use  [ ]  History/ Active substance use or alcohol use disorder  [ ] Psychiatric co-morbidity  [ ] Sleep apnea  [ ] COPD  [ ] BMI> 35  [ ] Liver dysfunction  [ ] Renal dysfunction  [ ] CHF  [ ] Smoker  [ ]  Age > 60 years    [X ]  [place]  Reviewed and Copied to Chart. See below.    Plan of care and goal oriented pain management treatment options were discussed with patient and /or primary care giver; all questions and concerns were addressed and care was aligned with patient's wishes.    Educated patient on goal oriented pain management treatment options

## 2022-04-28 NOTE — PROGRESS NOTE ADULT - ASSESSMENT
32 yo m no PMHx and PSHx of right knee ORIF presented with bilateral knee pain. s/p fell onto his knees while playing basketball.  CT revealed concern for patellar ligament rupture in L knee and fractured hardware in R knee with small hematoma. Plan for surgery Wednesday for b/l tendon reconstruction. Ortho following.   32 yo m no PMHx and PSHx of right knee ORIF presented with bilateral knee pain. S/P fall onto his knees while playing basketball.  CT revealed concern for patellar ligament rupture in Left knee and fractured hardware in Right knee with small hematoma. Plan for surgery Wednesday for b/l tendon reconstruction. Ortho following.    Patient was seen and examined in bed with NAD. Patient is s/p bilateral patellar tendon reconstruction POD #1. Bilateral leg with knee immobilizer. Dressing C/D/I with 2+ pulses, & good capillary refill.    30 yo m no PMHx and PSHx of right knee ORIF presented with bilateral knee pain. S/P fall onto his knees while playing basketball.  CT revealed concern for patellar ligament rupture in Left knee and fractured hardware in Right knee with small hematoma. Plan for surgery Wednesday for b/l tendon reconstruction. Ortho following.    Patient was seen and examined in bed with NAD. Patient is s/p bilateral patellar tendon reconstruction POD #1. Patient endorses pain via odalys knee 7/10. Pain management following. Bilateral leg with knee immobilizer. Dressing C/D/I with 2+ pulses, & good capillary refill. Leukocytosis noted 16, likely reactive 2/2 post/op. Continue with post/op antibiotics x 24 hrs. Patient is pending PT eval.    30 yo m no PMHx and PSHx of right knee ORIF presented with bilateral knee pain. S/P fall onto his knees while playing basketball.  CT revealed concern for patellar ligament rupture in Left knee and fractured hardware in Right knee with small hematoma. Plan for surgery Wednesday for b/l tendon reconstruction. Ortho following.    Patient was seen and examined in bed with NAD. Patient is s/p bilateral patellar tendon reconstruction POD #1. Patient endorses pain via odalys knee 7/10. Denies numbness, chills or fever. Pain management following. Bilateral leg with knee immobilizer. Dressing C/D/I with 2+ pulses, & good capillary refill. Leukocytosis noted 16, likely reactive 2/2 post/op. Continue with post/op antibiotics x 24 hrs. Patient is pending PT eval today.   30 yo m no PMHx and PSHx of right knee ORIF presented with bilateral knee pain. S/P fall onto his knees while playing basketball.  CT revealed concern for patellar ligament rupture in Left knee and fractured hardware in Right knee with small hematoma. Plan for surgery Wednesday for b/l tendon reconstruction. Ortho following.    Patient was seen and examined in bed with NAD. Patient is s/p bilateral patellar tendon reconstruction POD #1. Patient endorses pain via odalys knee 7/10. Denies numbness, chills or fever. Pain management following. Bilateral leg with knee immobilizer. Dressing C/D/I with 2+ pulses, & good capillary refill. Leukocytosis noted 16, likely reactive 2/2 post/o, however afebrile. Continue with post/op antibiotics x 24 hrs. Patient is pending PT eval today.

## 2022-04-28 NOTE — PROGRESS NOTE ADULT - SUBJECTIVE AND OBJECTIVE BOX
31yMale    Diagnosis:  S/p Bilateral patellar tendon repair with NIKKI of Right knee POD#1    Patient was seen and evaluated at bedside. Patient complaining of decreased sensation of the RLE distal to the incision in both anterior and posterior parts of the leg.   Pain is  controlled.    Denies CP/SOB, dyspnea, paresthesias, N/V/D, palpitations.     Vital Signs Last 24 Hrs  T(C): 36.9 (28 Apr 2022 05:30), Max: 37.1 (27 Apr 2022 20:45)  T(F): 98.5 (28 Apr 2022 05:30), Max: 98.7 (27 Apr 2022 20:45)  HR: 107 (28 Apr 2022 07:00) (85 - 115)  BP: 130/85 (28 Apr 2022 05:30) (129/81 - 172/94)  BP(mean): 111 (27 Apr 2022 20:45) (101 - 125)  RR: 17 (28 Apr 2022 07:00) (12 - 20)  SpO2: 98% (28 Apr 2022 07:00) (95% - 98%)  I&O's Detail    27 Apr 2022 07:01  -  28 Apr 2022 07:00  --------------------------------------------------------  IN:    Lactated Ringers Bolus: 1200 mL  Total IN: 1200 mL    OUT:  Total OUT: 0 mL    Total NET: 1200 mL          Physical Exam:    General: AAOx3, NAD, resting comfortably in bed.    Bilateral knee:  Dressing is C/D/I. with denny locked in full extension b/l.   Skin is pink and warm. SILT ON LLE.. Decreased sensation on the RLE.  No drainage.   Lower extremities:  No calf tenderness, calves are soft. 2+pulses. NVI. 5/5 Strength of EHL/TA/gastrocnemius B/L.  Good capillary refill.                           15.3   16.03 )-----------( 332      ( 28 Apr 2022 07:43 )             45.9     04-28    139  |  106  |  20<H>  ----------------------------<  112<H>  3.8   |  24  |  1.32<H>    Ca    9.0      28 Apr 2022 07:43        Impression:  31yMale S/p Bilateral patellar tendon repair with NIKKI of Right knee POD#1  Plan:  -  Pain management  -  Dvt prophylaxis with Lovenox  -  Daily Physical Therapy:  WBAT of the BLLE with denny braces locked in full extension  -  Discharge planning: Home Vs. Rehab pending Physical therapy eval.  -  Continue with Post-op Antibiotics x 24hrs  -  Case d/w Dr. Coffey

## 2022-04-28 NOTE — CONSULT NOTE ADULT - ASSESSMENT
Confidential Drug Utilization Report  Search Terms: Geovany De Santiago, 1990   Search Date: 04/28/2022 12:20:29 PM   The Drug Utilization Report below displays all of the controlled substance prescriptions, if any, that your patient has filled in the last twelve months. The information displayed on this report is compiled from pharmacy submissions to the Department, and accurately reflects the information as submitted by the pharmacies.  This report was requested by: Judy Mooney | Reference #: [id number]   There are no results for the search terms that you entered.

## 2022-04-28 NOTE — CONSULT NOTE ADULT - PROBLEM SELECTOR RECOMMENDATION 9
Pt with b/l knee pain which is somatic in nature due to b/l patellar tendon repair 4/27 POD #1.   Opioid pain recommendations   - Continue Tramadol 50mg PO q 8 hours. Monitor for sedation/ respiratory depression.   - Increase oxycodone 10 mg PO q 4 hours PRN severe pain. Monitor for sedation/ respiratory depression.   - Dilaudid 0.5mg IV once now.   Non-opioid pain recommendations   - NSIADs per surgical team.   - Acetaminophen 1 gram PO q 6 hours x 3 days. Monitor LFTs  Bowel Regimen  - Continue Miralax 17G PO daily  - Continue Senna 2 tablets at bedtime for constipation  Mild pain   - Non-pharmacological pain treatment recommendations  - Warm/ Cool packs PRN   - Repositioning, elevation, imagery, relaxation, distraction.  - Physical therapy OOB if no contraindications   Recommendations discussed with primary team and RN

## 2022-04-28 NOTE — PROGRESS NOTE ADULT - PROBLEM SELECTOR PLAN 1
- s/p fall onto b/l knee, unable to ambulate  - Ct shows L patellar tendon rupture, fracture of right knee hardware, Old ORIF  - C/w b/l knee immobilizer placed in ED  - Plan for surgery wednesday 4/27/22  - c/w pain management  - c/w IVF . NPO after midnight  - PT consult post surgery  - Orthro following - S/P bilateral patellar tendon reconstruction POD #1  - C/w b/l knee immobilizer place  - c/w pain management  - Pain management on board  - PT consulted  - Ortho following

## 2022-04-28 NOTE — PROGRESS NOTE ADULT - PROBLEM SELECTOR PLAN 2
- Hold ac for now, anticipate surgery          dispo planning: OR tendon repair - C/W Lovenox for VTE prophylasix - VTE: C/W Lovenox   - GI: C/W Pepcid BID

## 2022-04-29 LAB
ANION GAP SERPL CALC-SCNC: 7 MMOL/L — SIGNIFICANT CHANGE UP (ref 5–17)
BASOPHILS # BLD AUTO: 0.04 K/UL — SIGNIFICANT CHANGE UP (ref 0–0.2)
BASOPHILS NFR BLD AUTO: 0.3 % — SIGNIFICANT CHANGE UP (ref 0–2)
BUN SERPL-MCNC: 18 MG/DL — SIGNIFICANT CHANGE UP (ref 7–18)
CALCIUM SERPL-MCNC: 9 MG/DL — SIGNIFICANT CHANGE UP (ref 8.4–10.5)
CHLORIDE SERPL-SCNC: 104 MMOL/L — SIGNIFICANT CHANGE UP (ref 96–108)
CO2 SERPL-SCNC: 26 MMOL/L — SIGNIFICANT CHANGE UP (ref 22–31)
CREAT SERPL-MCNC: 1.14 MG/DL — SIGNIFICANT CHANGE UP (ref 0.5–1.3)
EGFR: 88 ML/MIN/1.73M2 — SIGNIFICANT CHANGE UP
EOSINOPHIL # BLD AUTO: 0.18 K/UL — SIGNIFICANT CHANGE UP (ref 0–0.5)
EOSINOPHIL NFR BLD AUTO: 1.5 % — SIGNIFICANT CHANGE UP (ref 0–6)
GLUCOSE SERPL-MCNC: 103 MG/DL — HIGH (ref 70–99)
HCT VFR BLD CALC: 45.9 % — SIGNIFICANT CHANGE UP (ref 39–50)
HGB BLD-MCNC: 15 G/DL — SIGNIFICANT CHANGE UP (ref 13–17)
IMM GRANULOCYTES NFR BLD AUTO: 0.4 % — SIGNIFICANT CHANGE UP (ref 0–1.5)
LYMPHOCYTES # BLD AUTO: 2.6 K/UL — SIGNIFICANT CHANGE UP (ref 1–3.3)
LYMPHOCYTES # BLD AUTO: 21.9 % — SIGNIFICANT CHANGE UP (ref 13–44)
MCHC RBC-ENTMCNC: 26.4 PG — LOW (ref 27–34)
MCHC RBC-ENTMCNC: 32.7 GM/DL — SIGNIFICANT CHANGE UP (ref 32–36)
MCV RBC AUTO: 80.8 FL — SIGNIFICANT CHANGE UP (ref 80–100)
MONOCYTES # BLD AUTO: 1.31 K/UL — HIGH (ref 0–0.9)
MONOCYTES NFR BLD AUTO: 11 % — SIGNIFICANT CHANGE UP (ref 2–14)
NEUTROPHILS # BLD AUTO: 7.71 K/UL — HIGH (ref 1.8–7.4)
NEUTROPHILS NFR BLD AUTO: 64.9 % — SIGNIFICANT CHANGE UP (ref 43–77)
NRBC # BLD: 0 /100 WBCS — SIGNIFICANT CHANGE UP (ref 0–0)
PLATELET # BLD AUTO: 292 K/UL — SIGNIFICANT CHANGE UP (ref 150–400)
POTASSIUM SERPL-MCNC: 4.2 MMOL/L — SIGNIFICANT CHANGE UP (ref 3.5–5.3)
POTASSIUM SERPL-SCNC: 4.2 MMOL/L — SIGNIFICANT CHANGE UP (ref 3.5–5.3)
RBC # BLD: 5.68 M/UL — SIGNIFICANT CHANGE UP (ref 4.2–5.8)
RBC # FLD: 13.1 % — SIGNIFICANT CHANGE UP (ref 10.3–14.5)
SODIUM SERPL-SCNC: 137 MMOL/L — SIGNIFICANT CHANGE UP (ref 135–145)
WBC # BLD: 11.89 K/UL — HIGH (ref 3.8–10.5)
WBC # FLD AUTO: 11.89 K/UL — HIGH (ref 3.8–10.5)

## 2022-04-29 RX ORDER — POLYETHYLENE GLYCOL 3350 17 G/17G
17 POWDER, FOR SOLUTION ORAL
Qty: 0 | Refills: 0 | DISCHARGE
Start: 2022-04-29

## 2022-04-29 RX ORDER — OXYCODONE HYDROCHLORIDE 5 MG/1
1 TABLET ORAL
Qty: 0 | Refills: 0 | DISCHARGE
Start: 2022-04-29

## 2022-04-29 RX ORDER — TRAMADOL HYDROCHLORIDE 50 MG/1
1 TABLET ORAL
Qty: 0 | Refills: 0 | DISCHARGE
Start: 2022-04-29

## 2022-04-29 RX ORDER — SENNA PLUS 8.6 MG/1
2 TABLET ORAL
Qty: 0 | Refills: 0 | DISCHARGE
Start: 2022-04-29

## 2022-04-29 RX ORDER — ENOXAPARIN SODIUM 100 MG/ML
40 INJECTION SUBCUTANEOUS
Qty: 0 | Refills: 0 | DISCHARGE
Start: 2022-04-29 | End: 2022-05-11

## 2022-04-29 RX ORDER — CELECOXIB 200 MG/1
1 CAPSULE ORAL
Qty: 0 | Refills: 0 | DISCHARGE
Start: 2022-04-29

## 2022-04-29 RX ORDER — FAMOTIDINE 10 MG/ML
1 INJECTION INTRAVENOUS
Qty: 0 | Refills: 0 | DISCHARGE
Start: 2022-04-29

## 2022-04-29 RX ADMIN — OXYCODONE HYDROCHLORIDE 10 MILLIGRAM(S): 5 TABLET ORAL at 10:01

## 2022-04-29 RX ADMIN — POLYETHYLENE GLYCOL 3350 17 GRAM(S): 17 POWDER, FOR SOLUTION ORAL at 12:03

## 2022-04-29 RX ADMIN — TRAMADOL HYDROCHLORIDE 50 MILLIGRAM(S): 50 TABLET ORAL at 19:03

## 2022-04-29 RX ADMIN — TRAMADOL HYDROCHLORIDE 50 MILLIGRAM(S): 50 TABLET ORAL at 21:18

## 2022-04-29 RX ADMIN — ENOXAPARIN SODIUM 30 MILLIGRAM(S): 100 INJECTION SUBCUTANEOUS at 05:20

## 2022-04-29 RX ADMIN — ENOXAPARIN SODIUM 30 MILLIGRAM(S): 100 INJECTION SUBCUTANEOUS at 18:03

## 2022-04-29 RX ADMIN — TRAMADOL HYDROCHLORIDE 50 MILLIGRAM(S): 50 TABLET ORAL at 17:59

## 2022-04-29 RX ADMIN — Medication 500 MILLIGRAM(S): at 18:03

## 2022-04-29 RX ADMIN — Medication 1000 MILLIGRAM(S): at 19:03

## 2022-04-29 RX ADMIN — Medication 1 TABLET(S): at 11:52

## 2022-04-29 RX ADMIN — CELECOXIB 200 MILLIGRAM(S): 200 CAPSULE ORAL at 11:52

## 2022-04-29 RX ADMIN — OXYCODONE HYDROCHLORIDE 10 MILLIGRAM(S): 5 TABLET ORAL at 03:31

## 2022-04-29 RX ADMIN — Medication 1000 MILLIGRAM(S): at 01:15

## 2022-04-29 RX ADMIN — OXYCODONE HYDROCHLORIDE 10 MILLIGRAM(S): 5 TABLET ORAL at 01:15

## 2022-04-29 RX ADMIN — CELECOXIB 200 MILLIGRAM(S): 200 CAPSULE ORAL at 12:30

## 2022-04-29 RX ADMIN — Medication 1000 MILLIGRAM(S): at 07:03

## 2022-04-29 RX ADMIN — Medication 1 MILLIGRAM(S): at 11:52

## 2022-04-29 RX ADMIN — TRAMADOL HYDROCHLORIDE 50 MILLIGRAM(S): 50 TABLET ORAL at 07:03

## 2022-04-29 RX ADMIN — Medication 1000 MILLIGRAM(S): at 00:13

## 2022-04-29 RX ADMIN — FAMOTIDINE 20 MILLIGRAM(S): 10 INJECTION INTRAVENOUS at 05:20

## 2022-04-29 RX ADMIN — Medication 1000 MILLIGRAM(S): at 11:51

## 2022-04-29 RX ADMIN — Medication 1000 MILLIGRAM(S): at 12:30

## 2022-04-29 RX ADMIN — Medication 1000 MILLIGRAM(S): at 05:19

## 2022-04-29 RX ADMIN — TRAMADOL HYDROCHLORIDE 50 MILLIGRAM(S): 50 TABLET ORAL at 21:48

## 2022-04-29 RX ADMIN — OXYCODONE HYDROCHLORIDE 10 MILLIGRAM(S): 5 TABLET ORAL at 08:46

## 2022-04-29 RX ADMIN — Medication 500 MILLIGRAM(S): at 05:20

## 2022-04-29 RX ADMIN — TRAMADOL HYDROCHLORIDE 50 MILLIGRAM(S): 50 TABLET ORAL at 05:19

## 2022-04-29 RX ADMIN — FAMOTIDINE 20 MILLIGRAM(S): 10 INJECTION INTRAVENOUS at 18:03

## 2022-04-29 RX ADMIN — Medication 1000 MILLIGRAM(S): at 18:00

## 2022-04-29 NOTE — PROGRESS NOTE ADULT - ASSESSMENT
32 yo m no PMHx and PSHx of right knee ORIF presented with bilateral knee pain. S/P fall onto his knees while playing basketball. CT revealed concern for patellar ligament rupture in Left knee and fractured hardware in Right knee with small hematoma. Plan for surgery Wednesday for b/l tendon reconstruction. Ortho following.    Patient was seen and examined in bed with NAD. Patient is s/p bilateral patellar tendon reconstruction POD #2. Patient endorses pain via odalys knee 5/10. Pain management following. Denies numbness, but reports right foot pins & needle feeling. Patient is afebrile. Bilateral leg with knee immobilizer. Dressing C/D/I with 2+ pulses, & good capillary refill. Leukocytosis trending down 11, likely reactive 2/2 post/op. Post/op antibiotics completed.  Pending PT reevaluation.   30 yo m no PMHx and PSHx of right knee ORIF presented with bilateral knee pain. S/P fall onto his knees while playing basketball. CT revealed concern for patellar ligament rupture in Left knee and fractured hardware in Right knee with small hematoma. Plan for surgery Wednesday for b/l tendon reconstruction. Ortho following.    Patient was seen and examined in bed with NAD. Patient is s/p bilateral patellar tendon reconstruction POD #2. Patient endorses pain via odalys knee 5/10. Pain management following. Denies numbness, but reports right foot pins & needle feeling. Patient is afebrile. Bilateral leg with knee immobilizer. Dressing C/D/I with 2+ pulses, & good capillary refill. Leukocytosis trending down 11, likely reactive 2/2 post/op. Post/op antibiotics completed. PT reevaluation recommends acute rehab.

## 2022-04-29 NOTE — PROGRESS NOTE ADULT - ATTENDING COMMENTS
d/w patient and staff
d/w patient and staff
d/w patient and staff    MEDICALLY CLEARED FOR LOW RISK PROCEDURE
d/w patient and staff

## 2022-04-29 NOTE — PROGRESS NOTE ADULT - SUBJECTIVE AND OBJECTIVE BOX
31yMale    Diagnosis:  S/p Bilateral patellar tendon repair with NIKKI of Right knee POD#2    Patient was seen and evaluated at bedside. Patient complaining of decreased sensation of the RLE distal to the incision in both anterior and posterior parts of the leg, but improving since yesterday  Pain is  controlled better than yesterday.   Denies CP/SOB, dyspnea, paresthesias, N/V/D, palpitations.       Vital Signs Last 24 Hrs  T(C): 37 (04-29-22 @ 14:34), Max: 37.2 (04-28-22 @ 20:33)  T(F): 98.6 (04-29-22 @ 14:34), Max: 99 (04-28-22 @ 20:33)  HR: 98 (04-29-22 @ 14:34) (86 - 108)  BP: 128/82 (04-29-22 @ 14:34) (128/82 - 153/91)  BP(mean): --  RR: 18 (04-29-22 @ 14:34) (18 - 18)  SpO2: 98% (04-29-22 @ 14:34) (96% - 98%)              Physical Exam:    General: AAOx3, NAD, resting comfortably in bed.    Bilateral knee:  Dressing is C/D/I. with denny locked in full extension b/l.   Skin is pink and warm. SILT ON LLE.. Decreased sensation on the RLE.  No drainage.   Lower extremities:  No calf tenderness, calves are soft. 2+pulses. NVI. 5/5 Strength of EHL/TA/gastrocnemius B/L.  Good capillary refill.                                      15.0   11.89 )-----------( 292      ( 29 Apr 2022 06:33 )             45.9   04-29    137  |  104  |  18  ----------------------------<  103<H>  4.2   |  26  |  1.14    Ca    9.0      29 Apr 2022 06:33          Impression:  31yMale S/p Bilateral patellar tendon repair with NIKKI of Right knee POD#2  Plan:  -  Pain management  -  Dvt prophylaxis with Lovenox  -  Daily Physical Therapy:  WBAT of the BLLE with denny braces locked in full extension  -  Discharge planning: acute rehab  -  Case d/w Dr. Coffey

## 2022-04-29 NOTE — PROGRESS NOTE ADULT - PROBLEM SELECTOR PLAN 1
- S/P bilateral patellar tendon reconstruction POD #2  - C/w b/l knee immobilizer place  - c/w pain management  - Pain management on board  - Pending PT reevaluation.  - Ortho following

## 2022-04-29 NOTE — PROGRESS NOTE ADULT - SUBJECTIVE AND OBJECTIVE BOX
NP Note discussed with  primary attending    Patient is a 31y old  Male who presents with a chief complaint of S/P Fall (28 Apr 2022 12:37)      INTERVAL HPI/OVERNIGHT EVENTS: no new complaints    MEDICATIONS  (STANDING):  acetaminophen     Tablet .. 1000 milliGRAM(s) Oral every 6 hours  ascorbic acid 500 milliGRAM(s) Oral two times a day  celecoxib 200 milliGRAM(s) Oral daily  enoxaparin Injectable 30 milliGRAM(s) SubCutaneous every 12 hours  famotidine    Tablet 20 milliGRAM(s) Oral every 12 hours  folic acid 1 milliGRAM(s) Oral daily  multivitamin 1 Tablet(s) Oral daily  polyethylene glycol 3350 17 Gram(s) Oral daily  senna 2 Tablet(s) Oral at bedtime  traMADol 50 milliGRAM(s) Oral every 8 hours    MEDICATIONS  (PRN):  oxyCODONE    IR 10 milliGRAM(s) Oral every 4 hours PRN Severe Pain (7 - 10)      __________________________________________________  REVIEW OF SYSTEMS:    CONSTITUTIONAL: No fever,   EYES: no acute visual disturbances  NECK: No pain or stiffness  RESPIRATORY: No cough; No shortness of breath  CARDIOVASCULAR: No chest pain, no palpitations  GASTROINTESTINAL: No pain. No nausea or vomiting; No diarrhea   NEUROLOGICAL: No headache or numbness, no tremors  MUSCULOSKELETAL: No joint pain, no muscle pain  GENITOURINARY: no dysuria, no frequency, no hesitancy  PSYCHIATRY: no depression , no anxiety  ALL OTHER  ROS negative        Vital Signs Last 24 Hrs  T(C): 36.9 (29 Apr 2022 05:20), Max: 37.2 (28 Apr 2022 20:33)  T(F): 98.4 (29 Apr 2022 05:20), Max: 99 (28 Apr 2022 20:33)  HR: 108 (29 Apr 2022 09:35) (86 - 108)  BP: 151/100 (29 Apr 2022 09:35) (142/96 - 155/93)  BP(mean): --  RR: 18 (29 Apr 2022 05:20) (18 - 18)  SpO2: 96% (29 Apr 2022 09:35) (96% - 97%)    ________________________________________________  PHYSICAL EXAM:  GENERAL: NAD  HEENT: Normocephalic;  conjunctivae and sclerae clear; moist mucous membranes;   NECK : supple  CHEST/LUNG: Clear to ausculitation bilaterally with good air entry   HEART: S1 S2  regular; no murmurs, gallops or rubs  ABDOMEN: Soft, Nontender, Nondistended; Bowel sounds present  EXTREMITIES: no cyanosis; no edema; no calf tenderness  SKIN: warm and dry; no rash  NERVOUS SYSTEM:  Awake and alert; Oriented  to place, person and time ; no new deficits    _________________________________________________  LABS:                        15.0   11.89 )-----------( 292      ( 29 Apr 2022 06:33 )             45.9     04-29    137  |  104  |  18  ----------------------------<  103<H>  4.2   |  26  |  1.14    Ca    9.0      29 Apr 2022 06:33          CAPILLARY BLOOD GLUCOSE            RADIOLOGY & ADDITIONAL TESTS:    Imaging Personally Reviewed:  YES/NO    Consultant(s) Notes Reviewed:   YES/ No    Care Discussed with Consultants :     Plan of care was discussed with patient and /or primary care giver; all questions and concerns were addressed and care was aligned with patient's wishes.

## 2022-04-30 RX ADMIN — Medication 1000 MILLIGRAM(S): at 01:02

## 2022-04-30 RX ADMIN — Medication 1000 MILLIGRAM(S): at 06:06

## 2022-04-30 RX ADMIN — OXYCODONE HYDROCHLORIDE 10 MILLIGRAM(S): 5 TABLET ORAL at 01:03

## 2022-04-30 RX ADMIN — Medication 500 MILLIGRAM(S): at 18:21

## 2022-04-30 RX ADMIN — Medication 1000 MILLIGRAM(S): at 12:51

## 2022-04-30 RX ADMIN — ENOXAPARIN SODIUM 30 MILLIGRAM(S): 100 INJECTION SUBCUTANEOUS at 06:11

## 2022-04-30 RX ADMIN — TRAMADOL HYDROCHLORIDE 50 MILLIGRAM(S): 50 TABLET ORAL at 15:45

## 2022-04-30 RX ADMIN — FAMOTIDINE 20 MILLIGRAM(S): 10 INJECTION INTRAVENOUS at 06:11

## 2022-04-30 RX ADMIN — TRAMADOL HYDROCHLORIDE 50 MILLIGRAM(S): 50 TABLET ORAL at 22:00

## 2022-04-30 RX ADMIN — OXYCODONE HYDROCHLORIDE 10 MILLIGRAM(S): 5 TABLET ORAL at 00:33

## 2022-04-30 RX ADMIN — TRAMADOL HYDROCHLORIDE 50 MILLIGRAM(S): 50 TABLET ORAL at 06:06

## 2022-04-30 RX ADMIN — Medication 500 MILLIGRAM(S): at 06:11

## 2022-04-30 RX ADMIN — POLYETHYLENE GLYCOL 3350 17 GRAM(S): 17 POWDER, FOR SOLUTION ORAL at 18:26

## 2022-04-30 RX ADMIN — Medication 1000 MILLIGRAM(S): at 13:50

## 2022-04-30 RX ADMIN — CELECOXIB 200 MILLIGRAM(S): 200 CAPSULE ORAL at 13:59

## 2022-04-30 RX ADMIN — TRAMADOL HYDROCHLORIDE 50 MILLIGRAM(S): 50 TABLET ORAL at 14:45

## 2022-04-30 RX ADMIN — CELECOXIB 200 MILLIGRAM(S): 200 CAPSULE ORAL at 12:59

## 2022-04-30 RX ADMIN — TRAMADOL HYDROCHLORIDE 50 MILLIGRAM(S): 50 TABLET ORAL at 21:51

## 2022-04-30 RX ADMIN — Medication 1000 MILLIGRAM(S): at 00:32

## 2022-04-30 RX ADMIN — SENNA PLUS 2 TABLET(S): 8.6 TABLET ORAL at 21:51

## 2022-04-30 RX ADMIN — Medication 1 TABLET(S): at 12:49

## 2022-04-30 RX ADMIN — Medication 1 MILLIGRAM(S): at 12:49

## 2022-04-30 RX ADMIN — ENOXAPARIN SODIUM 30 MILLIGRAM(S): 100 INJECTION SUBCUTANEOUS at 18:22

## 2022-04-30 RX ADMIN — FAMOTIDINE 20 MILLIGRAM(S): 10 INJECTION INTRAVENOUS at 18:21

## 2022-04-30 NOTE — PROGRESS NOTE ADULT - SUBJECTIVE AND OBJECTIVE BOX
INTERVAL HPI/OVERNIGHT EVENTS:  Patient seen,no acute issues  VITAL SIGNS:  T(F): 98.3 (04-30-22 @ 05:00)  HR: 99 (04-30-22 @ 05:00)  BP: 122/73 (04-30-22 @ 05:00)  RR: 17 (04-30-22 @ 05:00)  SpO2: 100% (04-30-22 @ 05:00)  Wt(kg): --    PHYSICAL EXAM:  awake  Constitutional:  Eyes:  ENMT:nico  Neck:  Respiratory:clear  Cardiovascular:s12,m-none  Gastrointestinal:sofft,bs pos  Extremities:  Vascular:  Neurological:no focal deficit  Musculoskeletal:    MEDICATIONS  (STANDING):  acetaminophen     Tablet .. 1000 milliGRAM(s) Oral every 6 hours  ascorbic acid 500 milliGRAM(s) Oral two times a day  celecoxib 200 milliGRAM(s) Oral daily  enoxaparin Injectable 30 milliGRAM(s) SubCutaneous every 12 hours  famotidine    Tablet 20 milliGRAM(s) Oral every 12 hours  folic acid 1 milliGRAM(s) Oral daily  multivitamin 1 Tablet(s) Oral daily  polyethylene glycol 3350 17 Gram(s) Oral daily  senna 2 Tablet(s) Oral at bedtime  traMADol 50 milliGRAM(s) Oral every 8 hours    MEDICATIONS  (PRN):  oxyCODONE    IR 10 milliGRAM(s) Oral every 4 hours PRN Severe Pain (7 - 10)      Allergies    No Known Allergies    Intolerances        LABS:                        15.0   11.89 )-----------( 292      ( 29 Apr 2022 06:33 )             45.9     04-29    137  |  104  |  18  ----------------------------<  103<H>  4.2   |  26  |  1.14    Ca    9.0      29 Apr 2022 06:33            RADIOLOGY & ADDITIONAL TESTS:        Assessment and Plan:   · Assessment	  32 yo m no PMHx and PSHx of right knee ORIF presented with bilateral knee pain. S/P fall onto his knees while playing basketball. CT revealed concern for patellar ligament rupture in Left knee and fractured hardware in Right knee with small hematoma. Plan for surgery Wednesday for b/l tendon reconstruction. Ortho following.    Patient was seen and examined in bed with NAD. Patient is s/p bilateral patellar tendon reconstruction POD #2. Patient endorses pain via odalys knee 5/10. Pain management following. Denies numbness, but reports right foot pins & needle feeling. Patient is afebrile. Bilateral leg with knee immobilizer. Dressing C/D/I with 2+ pulses, & good capillary refill. Leukocytosis trending down 11, likely reactive 2/2 post/op. Post/op antibiotics completed. PT reevaluation recommends acute rehab.       COVID-19 Negative Lab Result:  · COVID-19 Negative Lab Result	COVID-19 ruled out     Problem/Plan - 1:  ·  Problem: Bilateral knee pain.   ·  Plan: - S/P bilateral patellar tendon reconstruction POD #3  - C/w b/l knee immobilizer place  - c/w pain management  - Pain management on board  - Pending PT reevaluation.  - Ortho following.  -d/c planning possible acute rehab     Problem/Plan - 2:  ·  Problem: Prophylactic measure.   ·  Plan: - VTE: C/W Lovenox   - GI: C/W Pepcid BID.

## 2022-04-30 NOTE — PROGRESS NOTE ADULT - SUBJECTIVE AND OBJECTIVE BOX
31yMale    Diagnosis:  S/p Bilateral patellar tendon repair with NIKKI of Right knee POD#3    Patient was seen and evaluated at bedside.   Patient complaining of decreased sensation of the RLE distal to the incision in both anterior and posterior parts of the leg, but improving today  Pain is  controlled better than yesterday.   Denies CP/SOB, dyspnea, paresthesias, N/V/D, palpitations.       Vital Signs Last 24 Hrs  T(C): 36.9 (30 Apr 2022 14:01), Max: 36.9 (29 Apr 2022 20:56)  T(F): 98.4 (30 Apr 2022 14:01), Max: 98.4 (29 Apr 2022 20:56)  HR: 107 (30 Apr 2022 14:01) (99 - 107)  BP: 134/76 (30 Apr 2022 14:01) (122/73 - 134/76)  BP(mean): --  RR: 17 (30 Apr 2022 14:01) (17 - 18)  SpO2: 100% (30 Apr 2022 14:01) (98% - 100%)      Physical Exam:    General: AAOx3, NAD, resting comfortably in bed.    Bilateral knee:  Dressing is C/D/I. with denny locked in full extension b/l.   Skin is pink and warm. SILT ON LLE.. Decreased sensation on the RLE.  No drainage.   Lower extremities:  No calf tenderness, calves are soft. 2+pulses. NVI. 5/5 Strength of EHL/TA/gastrocnemius B/L.  Good capillary refill.                                               15.0   11.89 )-----------( 292      ( 29 Apr 2022 06:33 )             45.9   04-29    137  |  104  |  18  ----------------------------<  103<H>  4.2   |  26  |  1.14    Ca    9.0      29 Apr 2022 06:33          Impression:  31yMale S/p Bilateral patellar tendon repair with NIKKI of Right knee POD#3  Plan:  -  Pain management  -  Dvt prophylaxis with Lovenox  -  Daily Physical Therapy:  WBAT of the BLLE with denny braces locked in full extension  -  Discharge planning: acute rehab  -  Case d/w Dr. Coffey

## 2022-05-01 LAB
ANION GAP SERPL CALC-SCNC: 8 MMOL/L — SIGNIFICANT CHANGE UP (ref 5–17)
BUN SERPL-MCNC: 18 MG/DL — SIGNIFICANT CHANGE UP (ref 7–18)
CALCIUM SERPL-MCNC: 9.7 MG/DL — SIGNIFICANT CHANGE UP (ref 8.4–10.5)
CHLORIDE SERPL-SCNC: 102 MMOL/L — SIGNIFICANT CHANGE UP (ref 96–108)
CO2 SERPL-SCNC: 26 MMOL/L — SIGNIFICANT CHANGE UP (ref 22–31)
CREAT SERPL-MCNC: 1.07 MG/DL — SIGNIFICANT CHANGE UP (ref 0.5–1.3)
EGFR: 95 ML/MIN/1.73M2 — SIGNIFICANT CHANGE UP
GLUCOSE SERPL-MCNC: 107 MG/DL — HIGH (ref 70–99)
HCT VFR BLD CALC: 46.5 % — SIGNIFICANT CHANGE UP (ref 39–50)
HGB BLD-MCNC: 15.3 G/DL — SIGNIFICANT CHANGE UP (ref 13–17)
MCHC RBC-ENTMCNC: 26.7 PG — LOW (ref 27–34)
MCHC RBC-ENTMCNC: 32.9 GM/DL — SIGNIFICANT CHANGE UP (ref 32–36)
MCV RBC AUTO: 81 FL — SIGNIFICANT CHANGE UP (ref 80–100)
NRBC # BLD: 0 /100 WBCS — SIGNIFICANT CHANGE UP (ref 0–0)
PLATELET # BLD AUTO: 371 K/UL — SIGNIFICANT CHANGE UP (ref 150–400)
POTASSIUM SERPL-MCNC: 4.2 MMOL/L — SIGNIFICANT CHANGE UP (ref 3.5–5.3)
POTASSIUM SERPL-SCNC: 4.2 MMOL/L — SIGNIFICANT CHANGE UP (ref 3.5–5.3)
RBC # BLD: 5.74 M/UL — SIGNIFICANT CHANGE UP (ref 4.2–5.8)
RBC # FLD: 13.2 % — SIGNIFICANT CHANGE UP (ref 10.3–14.5)
SARS-COV-2 RNA SPEC QL NAA+PROBE: SIGNIFICANT CHANGE UP
SODIUM SERPL-SCNC: 136 MMOL/L — SIGNIFICANT CHANGE UP (ref 135–145)
WBC # BLD: 12.59 K/UL — HIGH (ref 3.8–10.5)
WBC # FLD AUTO: 12.59 K/UL — HIGH (ref 3.8–10.5)

## 2022-05-01 RX ORDER — MUPIROCIN 20 MG/G
1 OINTMENT TOPICAL
Refills: 0 | Status: DISCONTINUED | OUTPATIENT
Start: 2022-05-01 | End: 2022-05-02

## 2022-05-01 RX ADMIN — TRAMADOL HYDROCHLORIDE 50 MILLIGRAM(S): 50 TABLET ORAL at 22:10

## 2022-05-01 RX ADMIN — TRAMADOL HYDROCHLORIDE 50 MILLIGRAM(S): 50 TABLET ORAL at 15:03

## 2022-05-01 RX ADMIN — TRAMADOL HYDROCHLORIDE 50 MILLIGRAM(S): 50 TABLET ORAL at 06:25

## 2022-05-01 RX ADMIN — CELECOXIB 200 MILLIGRAM(S): 200 CAPSULE ORAL at 12:51

## 2022-05-01 RX ADMIN — Medication 1 TABLET(S): at 12:19

## 2022-05-01 RX ADMIN — Medication 500 MILLIGRAM(S): at 17:59

## 2022-05-01 RX ADMIN — Medication 1000 MILLIGRAM(S): at 12:51

## 2022-05-01 RX ADMIN — ENOXAPARIN SODIUM 30 MILLIGRAM(S): 100 INJECTION SUBCUTANEOUS at 06:24

## 2022-05-01 RX ADMIN — POLYETHYLENE GLYCOL 3350 17 GRAM(S): 17 POWDER, FOR SOLUTION ORAL at 12:21

## 2022-05-01 RX ADMIN — TRAMADOL HYDROCHLORIDE 50 MILLIGRAM(S): 50 TABLET ORAL at 21:10

## 2022-05-01 RX ADMIN — SENNA PLUS 2 TABLET(S): 8.6 TABLET ORAL at 21:09

## 2022-05-01 RX ADMIN — Medication 500 MILLIGRAM(S): at 06:25

## 2022-05-01 RX ADMIN — ENOXAPARIN SODIUM 30 MILLIGRAM(S): 100 INJECTION SUBCUTANEOUS at 17:58

## 2022-05-01 RX ADMIN — TRAMADOL HYDROCHLORIDE 50 MILLIGRAM(S): 50 TABLET ORAL at 06:30

## 2022-05-01 RX ADMIN — MUPIROCIN 1 APPLICATION(S): 20 OINTMENT TOPICAL at 17:58

## 2022-05-01 RX ADMIN — FAMOTIDINE 20 MILLIGRAM(S): 10 INJECTION INTRAVENOUS at 17:59

## 2022-05-01 RX ADMIN — CELECOXIB 200 MILLIGRAM(S): 200 CAPSULE ORAL at 12:19

## 2022-05-01 RX ADMIN — Medication 1 MILLIGRAM(S): at 12:19

## 2022-05-01 RX ADMIN — Medication 1000 MILLIGRAM(S): at 12:21

## 2022-05-01 RX ADMIN — Medication 1000 MILLIGRAM(S): at 06:25

## 2022-05-01 RX ADMIN — Medication 1000 MILLIGRAM(S): at 06:30

## 2022-05-01 RX ADMIN — FAMOTIDINE 20 MILLIGRAM(S): 10 INJECTION INTRAVENOUS at 06:25

## 2022-05-01 RX ADMIN — TRAMADOL HYDROCHLORIDE 50 MILLIGRAM(S): 50 TABLET ORAL at 15:33

## 2022-05-01 NOTE — PROGRESS NOTE ADULT - SUBJECTIVE AND OBJECTIVE BOX
INTERVAL HPI/OVERNIGHT EVENTS:  Patient seen,no acuter issues  VITAL SIGNS:  T(F): 99.5 (05-01-22 @ 05:20)  HR: 115 (05-01-22 @ 05:20)  BP: 137/97 (05-01-22 @ 05:20)  RR: 17 (05-01-22 @ 05:20)  SpO2: 98% (05-01-22 @ 05:20)  Wt(kg): --    PHYSICAL EXAM:  awake  Constitutional:  Eyes:  ENMT:perrla  Neck:  Respiratory:clear  Cardiovascular:s1s2,m-none  Gastrointestinal:soft,bs pos  Extremities:b/l knees in brace  Vascular:  Neurological:no focal deficit  Musculoskeletal:    MEDICATIONS  (STANDING):  acetaminophen     Tablet .. 1000 milliGRAM(s) Oral every 6 hours  ascorbic acid 500 milliGRAM(s) Oral two times a day  celecoxib 200 milliGRAM(s) Oral daily  enoxaparin Injectable 30 milliGRAM(s) SubCutaneous every 12 hours  famotidine    Tablet 20 milliGRAM(s) Oral every 12 hours  folic acid 1 milliGRAM(s) Oral daily  multivitamin 1 Tablet(s) Oral daily  mupirocin 2% Ointment 1 Application(s) Both Nostrils two times a day  polyethylene glycol 3350 17 Gram(s) Oral daily  senna 2 Tablet(s) Oral at bedtime  traMADol 50 milliGRAM(s) Oral every 8 hours    MEDICATIONS  (PRN):  oxyCODONE    IR 10 milliGRAM(s) Oral every 4 hours PRN Severe Pain (7 - 10)      Allergies    No Known Allergies    Intolerances        LABS:                        15.3   12.59 )-----------( 371      ( 01 May 2022 05:52 )             46.5     05-01    136  |  102  |  18  ----------------------------<  107<H>  4.2   |  26  |  1.07    Ca    9.7      01 May 2022 05:52            RADIOLOGY & ADDITIONAL TESTS:        Assessment and Plan:   · Assessment	  30 yo m no PMHx and PSHx of right knee ORIF presented with bilateral knee pain. S/P fall onto his knees while playing basketball. CT revealed concern for patellar ligament rupture in Left knee and fractured hardware in Right knee with small hematoma. Plan for surgery Wednesday for b/l tendon reconstruction. Ortho following.    Patient was seen and examined in bed with NAD. Patient is s/p bilateral patellar tendon reconstruction POD #2. Patient endorses pain via odalys knee 5/10. Pain management following. Denies numbness, but reports right foot pins & needle feeling. Patient is afebrile. Bilateral leg with knee immobilizer. Dressing C/D/I with 2+ pulses, & good capillary refill. Leukocytosis trending down 11, likely reactive 2/2 post/op. Post/op antibiotics completed. PT reevaluation recommends acute rehab.       COVID-19 Negative Lab Result:  · COVID-19 Negative Lab Result	COVID-19 ruled out     Problem/Plan - 1:  ·  Problem: Bilateral knee pain.   ·  Plan: - S/P bilateral patellar tendon reconstruction POD #3  - C/w b/l knee immobilizer place  - c/w pain management  - Pain management on board  - Pending PT reevaluation.  - Ortho following.  -d/c planning possible acute rehab     Problem/Plan - 2:  ·  Problem: Prophylactic measure.   ·  Plan: - VTE: C/W Lovenox   - GI: C/W Pepcid BID.

## 2022-05-01 NOTE — PROGRESS NOTE ADULT - SUBJECTIVE AND OBJECTIVE BOX
31yMale    Diagnosis:  S/p Bilateral patellar tendon repair with NIKKI of Right knee POD#4    Patient was seen and evaluated at bedside.   Patient complaining of decreased sensation of the RLE distal to the incision in both anterior and posterior parts of the leg, but improving today  Pain is  controlled better than yesterday.   Denies CP/SOB, dyspnea, paresthesias, N/V/D, palpitations.         Vital Signs Last 24 Hrs  T(C): 37.5 (01 May 2022 05:20), Max: 37.5 (01 May 2022 05:20)  T(F): 99.5 (01 May 2022 05:20), Max: 99.5 (01 May 2022 05:20)  HR: 115 (01 May 2022 05:20) (99 - 122)  BP: 137/97 (01 May 2022 05:20) (127/77 - 145/89)  BP(mean): --  RR: 17 (01 May 2022 05:20) (17 - 17)  SpO2: 98% (01 May 2022 05:20) (98% - 100%)    Physical Exam:    General: AAOx3, NAD, resting comfortably in bed.    Bilateral knee:  Dressing is C/D/I. with denny locked in full extension b/l.   Skin is pink and warm. SILT ON LLE.. Decreased sensation on the RLE.  No drainage.   Lower extremities:  No calf tenderness, calves are soft. 2+pulses. NVI. 5/5 Strength of EHL/TA/gastrocnemius B/L.  Good capillary refill.                  labs    Impression:  31yMale S/p Bilateral patellar tendon repair with NIKKI of Right knee POD#3  Plan:  -  Pain management  -  Dvt prophylaxis with Lovenox  -  Daily Physical Therapy:  WBAT of the BLLE with denny braces locked in full extension  -  Discharge planning: acute rehab  - Orthopedically stable for discharge  -  Case d/w Dr. Coffey    31yMale    Diagnosis:  S/p Bilateral patellar tendon repair with NIKKI of Right knee POD#4    Patient was seen and evaluated at bedside.   Patient complaining of decreased sensation of the RLE distal to the incision in both anterior and posterior parts of the leg, but improving today  Pain is  controlled better than yesterday.   Denies CP/SOB, dyspnea, paresthesias, N/V/D, palpitations.         Vital Signs Last 24 Hrs  T(C): 37.5 (01 May 2022 05:20), Max: 37.5 (01 May 2022 05:20)  T(F): 99.5 (01 May 2022 05:20), Max: 99.5 (01 May 2022 05:20)  HR: 115 (01 May 2022 05:20) (99 - 122)  BP: 137/97 (01 May 2022 05:20) (127/77 - 145/89)  BP(mean): --  RR: 17 (01 May 2022 05:20) (17 - 17)  SpO2: 98% (01 May 2022 05:20) (98% - 100%)    Physical Exam:    General: AAOx3, NAD, resting comfortably in bed.    Bilateral knee:  Dressing is C/D/I. with denny locked in full extension b/l.   Skin is pink and warm. SILT ON LLE.. Decreased sensation on the RLE.  No drainage.   Lower extremities:  No calf tenderness, calves are soft. 2+pulses. NVI. 5/5 Strength of EHL/TA/gastrocnemius B/L.  Good capillary refill.                  labs    Impression:  31yMale S/p Bilateral patellar tendon repair with NIKKI of Right knee POD#4  Plan:  -  Pain management  -  Dvt prophylaxis with Lovenox  -  Daily Physical Therapy:  WBAT of the BLLE with denny braces locked in full extension  -  Discharge planning: acute rehab  - Orthopedically stable for discharge  -  Case d/w Dr. Coffey

## 2022-05-02 ENCOUNTER — TRANSCRIPTION ENCOUNTER (OUTPATIENT)
Age: 32
End: 2022-05-02

## 2022-05-02 ENCOUNTER — INPATIENT (INPATIENT)
Facility: HOSPITAL | Age: 32
LOS: 8 days | Discharge: HOME HEALTH | DRG: 950 | End: 2022-05-11
Attending: PHYSICAL MEDICINE & REHABILITATION | Admitting: PHYSICAL MEDICINE & REHABILITATION
Payer: COMMERCIAL

## 2022-05-02 VITALS
WEIGHT: 197.75 LBS | DIASTOLIC BLOOD PRESSURE: 89 MMHG | TEMPERATURE: 99 F | SYSTOLIC BLOOD PRESSURE: 129 MMHG | RESPIRATION RATE: 20 BRPM | OXYGEN SATURATION: 100 % | HEIGHT: 70 IN | HEART RATE: 107 BPM

## 2022-05-02 VITALS
OXYGEN SATURATION: 100 % | HEART RATE: 118 BPM | DIASTOLIC BLOOD PRESSURE: 84 MMHG | TEMPERATURE: 98 F | SYSTOLIC BLOOD PRESSURE: 131 MMHG | RESPIRATION RATE: 17 BRPM

## 2022-05-02 DIAGNOSIS — S82.009A UNSPECIFIED FRACTURE OF UNSPECIFIED PATELLA, INITIAL ENCOUNTER FOR CLOSED FRACTURE: ICD-10-CM

## 2022-05-02 DIAGNOSIS — Z98.890 OTHER SPECIFIED POSTPROCEDURAL STATES: Chronic | ICD-10-CM

## 2022-05-02 PROCEDURE — 87641 MR-STAPH DNA AMP PROBE: CPT

## 2022-05-02 PROCEDURE — 85027 COMPLETE CBC AUTOMATED: CPT

## 2022-05-02 PROCEDURE — 85610 PROTHROMBIN TIME: CPT

## 2022-05-02 PROCEDURE — 99222 1ST HOSP IP/OBS MODERATE 55: CPT | Mod: GC

## 2022-05-02 PROCEDURE — 97530 THERAPEUTIC ACTIVITIES: CPT

## 2022-05-02 PROCEDURE — 99232 SBSQ HOSP IP/OBS MODERATE 35: CPT

## 2022-05-02 PROCEDURE — 97116 GAIT TRAINING THERAPY: CPT

## 2022-05-02 PROCEDURE — C1713: CPT

## 2022-05-02 PROCEDURE — 73700 CT LOWER EXTREMITY W/O DYE: CPT | Mod: MA

## 2022-05-02 PROCEDURE — 99285 EMERGENCY DEPT VISIT HI MDM: CPT | Mod: 25

## 2022-05-02 PROCEDURE — 85730 THROMBOPLASTIN TIME PARTIAL: CPT

## 2022-05-02 PROCEDURE — 97110 THERAPEUTIC EXERCISES: CPT

## 2022-05-02 PROCEDURE — 80048 BASIC METABOLIC PNL TOTAL CA: CPT

## 2022-05-02 PROCEDURE — 86901 BLOOD TYPING SEROLOGIC RH(D): CPT

## 2022-05-02 PROCEDURE — 85025 COMPLETE CBC W/AUTO DIFF WBC: CPT

## 2022-05-02 PROCEDURE — 87640 STAPH A DNA AMP PROBE: CPT

## 2022-05-02 PROCEDURE — 86850 RBC ANTIBODY SCREEN: CPT

## 2022-05-02 PROCEDURE — 73562 X-RAY EXAM OF KNEE 3: CPT

## 2022-05-02 PROCEDURE — 76376 3D RENDER W/INTRP POSTPROCES: CPT

## 2022-05-02 PROCEDURE — 36415 COLL VENOUS BLD VENIPUNCTURE: CPT

## 2022-05-02 PROCEDURE — 76000 FLUOROSCOPY <1 HR PHYS/QHP: CPT

## 2022-05-02 PROCEDURE — 87635 SARS-COV-2 COVID-19 AMP PRB: CPT

## 2022-05-02 PROCEDURE — 86900 BLOOD TYPING SEROLOGIC ABO: CPT

## 2022-05-02 RX ORDER — TRAMADOL HYDROCHLORIDE 50 MG/1
50 TABLET ORAL ONCE
Refills: 0 | Status: DISCONTINUED | OUTPATIENT
Start: 2022-05-02 | End: 2022-05-02

## 2022-05-02 RX ORDER — OXYCODONE HYDROCHLORIDE 5 MG/1
5 TABLET ORAL EVERY 4 HOURS
Refills: 0 | Status: DISCONTINUED | OUTPATIENT
Start: 2022-05-02 | End: 2022-05-02

## 2022-05-02 RX ORDER — SENNA PLUS 8.6 MG/1
2 TABLET ORAL AT BEDTIME
Refills: 0 | Status: DISCONTINUED | OUTPATIENT
Start: 2022-05-02 | End: 2022-05-08

## 2022-05-02 RX ORDER — ACETAMINOPHEN 500 MG
975 TABLET ORAL EVERY 8 HOURS
Refills: 0 | Status: DISCONTINUED | OUTPATIENT
Start: 2022-05-02 | End: 2022-05-04

## 2022-05-02 RX ORDER — CELECOXIB 200 MG/1
200 CAPSULE ORAL DAILY
Refills: 0 | Status: DISCONTINUED | OUTPATIENT
Start: 2022-05-03 | End: 2022-05-11

## 2022-05-02 RX ORDER — FAMOTIDINE 10 MG/ML
20 INJECTION INTRAVENOUS
Refills: 0 | Status: DISCONTINUED | OUTPATIENT
Start: 2022-05-02 | End: 2022-05-11

## 2022-05-02 RX ORDER — POLYETHYLENE GLYCOL 3350 17 G/17G
17 POWDER, FOR SOLUTION ORAL DAILY
Refills: 0 | Status: DISCONTINUED | OUTPATIENT
Start: 2022-05-03 | End: 2022-05-08

## 2022-05-02 RX ORDER — ENOXAPARIN SODIUM 100 MG/ML
40 INJECTION SUBCUTANEOUS
Refills: 0 | Status: DISCONTINUED | OUTPATIENT
Start: 2022-05-03 | End: 2022-05-11

## 2022-05-02 RX ORDER — TRAMADOL HYDROCHLORIDE 50 MG/1
50 TABLET ORAL EVERY 8 HOURS
Refills: 0 | Status: DISCONTINUED | OUTPATIENT
Start: 2022-05-02 | End: 2022-05-04

## 2022-05-02 RX ORDER — MUPIROCIN 20 MG/G
1 OINTMENT TOPICAL
Qty: 0 | Refills: 0 | DISCHARGE
Start: 2022-05-02

## 2022-05-02 RX ORDER — ACETAMINOPHEN 500 MG
1000 TABLET ORAL EVERY 8 HOURS
Refills: 0 | Status: DISCONTINUED | OUTPATIENT
Start: 2022-05-02 | End: 2022-05-02

## 2022-05-02 RX ORDER — ASCORBIC ACID 60 MG
1 TABLET,CHEWABLE ORAL
Qty: 0 | Refills: 0 | DISCHARGE
Start: 2022-05-02

## 2022-05-02 RX ORDER — FOLIC ACID 0.8 MG
1 TABLET ORAL
Qty: 0 | Refills: 0 | DISCHARGE
Start: 2022-05-02

## 2022-05-02 RX ORDER — MUPIROCIN 20 MG/G
1 OINTMENT TOPICAL
Refills: 0 | Status: DISCONTINUED | OUTPATIENT
Start: 2022-05-02 | End: 2022-05-06

## 2022-05-02 RX ORDER — ACETAMINOPHEN 500 MG
2 TABLET ORAL
Qty: 0 | Refills: 0 | DISCHARGE
Start: 2022-05-02

## 2022-05-02 RX ADMIN — TRAMADOL HYDROCHLORIDE 50 MILLIGRAM(S): 50 TABLET ORAL at 22:45

## 2022-05-02 RX ADMIN — TRAMADOL HYDROCHLORIDE 50 MILLIGRAM(S): 50 TABLET ORAL at 21:11

## 2022-05-02 RX ADMIN — TRAMADOL HYDROCHLORIDE 50 MILLIGRAM(S): 50 TABLET ORAL at 18:45

## 2022-05-02 RX ADMIN — TRAMADOL HYDROCHLORIDE 50 MILLIGRAM(S): 50 TABLET ORAL at 06:54

## 2022-05-02 RX ADMIN — MUPIROCIN 1 APPLICATION(S): 20 OINTMENT TOPICAL at 05:55

## 2022-05-02 RX ADMIN — Medication 500 MILLIGRAM(S): at 05:54

## 2022-05-02 RX ADMIN — OXYCODONE HYDROCHLORIDE 5 MILLIGRAM(S): 5 TABLET ORAL at 13:54

## 2022-05-02 RX ADMIN — FAMOTIDINE 20 MILLIGRAM(S): 10 INJECTION INTRAVENOUS at 05:54

## 2022-05-02 RX ADMIN — Medication 1 MILLIGRAM(S): at 11:47

## 2022-05-02 RX ADMIN — CELECOXIB 200 MILLIGRAM(S): 200 CAPSULE ORAL at 11:46

## 2022-05-02 RX ADMIN — POLYETHYLENE GLYCOL 3350 17 GRAM(S): 17 POWDER, FOR SOLUTION ORAL at 11:47

## 2022-05-02 RX ADMIN — CELECOXIB 200 MILLIGRAM(S): 200 CAPSULE ORAL at 12:46

## 2022-05-02 RX ADMIN — ENOXAPARIN SODIUM 30 MILLIGRAM(S): 100 INJECTION SUBCUTANEOUS at 05:54

## 2022-05-02 RX ADMIN — Medication 1 TABLET(S): at 11:47

## 2022-05-02 RX ADMIN — TRAMADOL HYDROCHLORIDE 50 MILLIGRAM(S): 50 TABLET ORAL at 05:54

## 2022-05-02 RX ADMIN — TRAMADOL HYDROCHLORIDE 50 MILLIGRAM(S): 50 TABLET ORAL at 17:45

## 2022-05-02 NOTE — DISCHARGE NOTE NURSING/CASE MANAGEMENT/SOCIAL WORK - NSDCPEFALRISK_GEN_ALL_CORE
For information on Fall & Injury Prevention, visit: [email or web address] OR  [email or web address] OR  https://www.cdc.gov/steadi/patient.html

## 2022-05-02 NOTE — DIETITIAN INITIAL EVALUATION ADULT - PERTINENT LABORATORY DATA
05-01    136  |  102  |  18  ----------------------------<  107<H>  4.2   |  26  |  1.07    Ca    9.7      01 May 2022 05:52

## 2022-05-02 NOTE — H&P ADULT - NS ATTEND AMEND GEN_ALL_CORE FT
Rehab Attending- Patient seen and examined by me - Case discussed, above note reviewed by me with modifications made    Rehab gait ab SP Maynor patella tendon tears SP repair  WBAT BLEs in Locked bledsoes  DVT proph- lovenox  pain management- celebrex, tylenol, tramadol PRN  will need raised toilet seat- ? electric recliner to aid with transfers  good candidate for intensive rehab- will tolerate 3 hours rehab daily

## 2022-05-02 NOTE — PATIENT PROFILE ADULT - FALL HARM RISK - HARM RISK INTERVENTIONS

## 2022-05-02 NOTE — H&P ADULT - HISTORY OF PRESENT ILLNESS
30 yo male w/ PSHx of right knee ORIF to ED [facility] on 4/24 w/ bilateral knee pain. Earlier that day playing basketball, attempted to jump but fell onto his knees. He experienced severe pain and was unable to ambulate. In ED: CT shows concern for patellar ligament rupture in L knee and fractured hardware in R knee with small hematoma. Ortho was consulted. Underwent bilateral knee tendon reconstruction 4/27. No complications. WBAT Avril. Pt was evaluated by PT with recommendation for acute rehab. Cleared for dc on 5/2.

## 2022-05-02 NOTE — H&P ADULT - ASSESSMENT
YANCI QURESHI  31yMale S/p Bilateral patellar tendon repair with NIKKI of Right knee     Gait Instability, ADL impairments and Functional impairments: start Comprehensive Rehab Program of PT/OT     - Pain management  - bowel regimen  -  Dvt prophylaxis with Lovenox  - WBAT of the BLLE with denny braces locked in full extension  - f/up ortho  An post rehab  -bowel regimen, mobilize      Pain Mgmt - Tylenol PRN  GI/Bowel Mgmt - Senna,  Miralax PRN  /Bladder Mgmt - Voiding independently, PVRx1      Precautions / PROPHYLAXIS:   - Falls  - Ortho: Weight bearing status: WBAT/denny  - Lungs:  Incentive Spirometer   - Pressure injury/Skin: Turn Q2hrs while in bed, OOB to Chair, PT/OT    - DVT: Lovenox    MEDICAL PROGNOSIS: GOOD            REHAB POTENTIAL: GOOD             ESTIMATED DISPOSITION: HOME WITH HOME CARE            ELOS: 10-14 Days   EXPECTED THERAPY:     P.T. 2hr/day       O.T. 1hr/day      S.L.P. na     P&O Unnecessary     EXP FREQUENCY: 5 days per 7 day period     PRESCREEN COMPARISION:   I have reviewed the prescreen information and I have found no relevant changes between the preadmission screening and my post admission evaluation     RATIONALE FOR INPATIENT ADMISSION - Patient demonstrates the following: (check all that apply)  [X] Medically appropriate for rehabilitation admission  [X] Has attainable rehab goals with an appropriate initial discharge plan  [X] Has rehabilitation potential (expected to make a significant improvement within a reasonable period of time)   [X] Requires close medical management by a rehab physician, rehab nursing care, Hospitalist and comprehensive interdisciplinary team (including PT, OT, & or SLP, Prosthetics and Orthotics)     YANCI QURESHI  31yMale S/p Bilateral patellar tendon repair with NIKKI of Right knee     Gait Instability, ADL impairments and Functional impairments: start Comprehensive Rehab Program of PT/OT     - Pain management  - bowel regimen  - Dvt prophylaxis with Lovenox until 5/11 per ortho  - Celebrex 200mg daily  - WBAT of the BLLE with denny braces locked in full extension  - f/up ortho Dr. Coffey post rehab  - mobilize      Pain Mgmt - Tylenol PRN  GI/Bowel Mgmt - Senna,  Miralax PRN  /Bladder Mgmt - Voiding independently, PVRx1      Precautions / PROPHYLAXIS:   - Falls  - Ortho: Weight bearing status: WBAT/denny  - Lungs:  Incentive Spirometer   - Pressure injury/Skin: Turn Q2hrs while in bed, OOB to Chair, PT/OT    - DVT: Lovenox    MEDICAL PROGNOSIS: GOOD            REHAB POTENTIAL: GOOD             ESTIMATED DISPOSITION: HOME WITH HOME CARE            ELOS: 10-14 Days   EXPECTED THERAPY:     P.T. 2hr/day       O.T. 1hr/day      S.L.P. na     P&O Unnecessary     EXP FREQUENCY: 5 days per 7 day period     PRESCREEN COMPARISION:   I have reviewed the prescreen information and I have found no relevant changes between the preadmission screening and my post admission evaluation     RATIONALE FOR INPATIENT ADMISSION - Patient demonstrates the following: (check all that apply)  [X] Medically appropriate for rehabilitation admission  [X] Has attainable rehab goals with an appropriate initial discharge plan  [X] Has rehabilitation potential (expected to make a significant improvement within a reasonable period of time)   [X] Requires close medical management by a rehab physician, rehab nursing care, Hospitalist and comprehensive interdisciplinary team (including PT, OT, & or SLP, Prosthetics and Orthotics)     YANCI QURESHI  31yMale S/p Bilateral patellar tendon repair with NIKKI of Right knee     Gait Instability, ADL impairments and Functional impairments: start Comprehensive Rehab Program of PT/OT     - Pain management  - bowel regimen  - Dvt prophylaxis with Lovenox until 5/11 per ortho  - Celebrex 200mg daily  - WBAT of the BLE with denny braces locked in full extension  - f/up ortho Dr. Coffey post rehab  - mobilize      Pain Mgmt - Tylenol PRN  GI/Bowel Mgmt - Senna,  Miralax PRN  /Bladder Mgmt - Voiding independently, PVRx1      Precautions / PROPHYLAXIS:   - Falls  - Ortho: Weight bearing status: WBAT/denny  - Lungs:  Incentive Spirometer   - Pressure injury/Skin: Turn Q2hrs while in bed, OOB to Chair, PT/OT    - DVT: Lovenox    MEDICAL PROGNOSIS: GOOD            REHAB POTENTIAL: GOOD             ESTIMATED DISPOSITION: HOME WITH HOME CARE            ELOS: 10-14 Days   EXPECTED THERAPY:     P.T. 2hr/day       O.T. 1hr/day      S.L.P. na     P&O Unnecessary     EXP FREQUENCY: 5 days per 7 day period     PRESCREEN COMPARISION:   I have reviewed the prescreen information and I have found no relevant changes between the preadmission screening and my post admission evaluation     RATIONALE FOR INPATIENT ADMISSION - Patient demonstrates the following: (check all that apply)  [X] Medically appropriate for rehabilitation admission  [X] Has attainable rehab goals with an appropriate initial discharge plan  [X] Has rehabilitation potential (expected to make a significant improvement within a reasonable period of time)   [X] Requires close medical management by a rehab physician, rehab nursing care, Hospitalist and comprehensive interdisciplinary team (including PT, OT, & or SLP, Prosthetics and Orthotics)

## 2022-05-02 NOTE — H&P ADULT - NSHPLABSRESULTS_GEN_ALL_CORE
ACC: [id number] EXAM:  CT 3D RECONSTRUCT CHELE MARTÍNEZ                        ACC: [id number] EXAM:  CT KNEE ONLY BI                          PROCEDURE DATE:  04/24/2022          INTERPRETATION:  CT OF THE BILATERAL KNEES    CLINICAL INFORMATION: Bilateral knee pain after playing basketball.   Evaluate for bilateral knee fractures.  TECHNIQUE: Multidetector CT of the bilateral knees. The study was   performed without the use of intravenous or intra-articular contrast.   Multiplanar reformats were generated for review. Three dimensional   reconstructions were obtained on an independent workstation. The   interpretation of these images is included in the body of the report of   the main portion of the study.    COMPARISON: Bilateral knee radiographs 4/24/2022.    FINDINGS:    RIGHT KNEE:    HARDWARE: Patient appears to be status post repair of the extensor   mechanism using cerclage wire. The wire is fractured in multiple places.    BONE: No acute fracture. No focal lytic or blastic lesions. Postsurgical   changes in the anterior aspect of the patella and tibia.    JOINTS: No dislocation. No knee joint effusion or lipohemarthrosis.   Cartilage spaces are maintained.    SOFT TISSUE: No focal muscle atrophy. Neurovascular structures are normal   in course and caliber. Edema in the subcutaneous fat along the anterior   aspect of the knee joint.      LEFT KNEE:    BONE: No acute fracture    JOINTS: No dislocation. No joint effusion or lipohemarthrosis.    SOFT TISSUE: Irregular appearance of the proximal patellar tendon   suspicious for tear. Mild amount of edema is seen in the surrounding   subcutaneous fat. No focal muscle atrophy. Neurovascular structures are   normal in course and caliber.      IMPRESSION:  1.  No acute fracture ordislocation.  2.  On the left, there is an irregular appearance of the proximal   patellar tendon concerning for patellar tendon tear. Consider   confirmation with ultrasound or MRI.  3.  Chronic postsurgical repair of the right extensor mechanism. Hardware   is fractured in multiple places, as described above.  4.  Edema in the subcutaneous fat along the anterior aspect of the right   knee suspicious for small hematoma.    --- End of Report ---            MONICO LIU MD; Attending Radiologist  This document has been electronically signed. Apr 24 2022  3:32PM

## 2022-05-02 NOTE — H&P ADULT - NSHPPHYSICALEXAM_GEN_ALL_CORE
General: NAD, Resting Comfortable,                                  HEENT: NC/AT, EOM I, PERRLA, Normal Conjunctivae  Cardio: RRR, Normal S1-S2, No M/G/R                              Pulm: No Respiratory Distress,  Lungs CTAB                        Abdomen: ND/NT, Soft, BS+                                                MSK: No joint swelling, Full ROM                                         Ext: No C/C/E, Pulses 2+ throughout, No calf tenderness    Skin:  all skin intact                                                                 Wounds: none  Decubitus Ulcers: None Present     Neurological Examination    Cognitive: AAO x 3                                                                         Attention: Intact   Judgment: Good evidence of judgement                               Memory: Recall 3 objects immediate and 3 min later      Mood/Affect: wnl                                                                           Communication:  Fluent,  No dysarthria   Swallow: intact  CN II - XII  intact  Coordination: FTN/HTS intact                                                                              Sensory: Intact to light touch, PP and Vibration                                                                                             Tone: normal Throughout   Balance: impaired    Motor    LEFT    UE: SF [5/5], EF [5/5], EE [5/5], WE [5/5],  [wnl]  RIGHT UE: SF [5/5], EF [5/5], EE [5/5], WE [5/5],  [wnl]  LEFT    LE:  HF [5/5], KE [5/5], DF [5/5], EHL [5/5],  PF [5/5]  RIGHT LE:  HF [5/5], KE [5/5], DF [5/5], EHL [5/5],  PF [5/5]      Reflex:  2 + General: NAD, Resting Comfortable,                                  HEENT: NC/AT, EOM I, PERRLA, Normal Conjunctivae  Cardio: RRR, Normal S1-S2, No M/G/R                              Pulm: No Respiratory Distress,  Lungs CTAB                        Abdomen: ND/NT, Soft, BS+                                                                                   Ext: No edema, No calf tenderness  Maynor LES with ace/ locked bledsoes  Skin:  all skin intact                                                                 Wounds: none  Decubitus Ulcers: None Present     Neurological Examination    Cognitive: AAO x 3                                                                         Attention: Intact   Judgment: Good evidence of judgement                               Memory: Recall 3 objects immediate and 3 min later      Mood/Affect: wnl                                                                           Communication:  Fluent,  No dysarthria   Swallow: intact  CN II - XII  intact  Coordination: FTN/HTS intact                                                                              Sensory: Intact to light touch, PP and Vibration                                                                                             Tone: normal Throughout       Motor    LEFT    UE: SF [5/5], EF [5/5], EE [5/5], WE [5/5],  [wnl]  RIGHT UE: SF [5/5], EF [5/5], EE [5/5], WE [5/5],  [wnl]  LEFT    LE:  HF [NT], KE [NT], DF [5/5], EHL [5/5],  PF [5/5]  RIGHT LE:  HF [NT], KE [NT], DF [5/5], EHL [5/5],  PF [5/5]      Reflex:  2 +UES, ankle maynor

## 2022-05-02 NOTE — PROGRESS NOTE ADULT - PROBLEM SELECTOR PROBLEM 2
Rupture of left patellar tendon
Prophylactic measure

## 2022-05-02 NOTE — PROGRESS NOTE ADULT - PROBLEM SELECTOR PROBLEM 1
Bilateral knee pain

## 2022-05-02 NOTE — DIETITIAN INITIAL EVALUATION ADULT - OTHER INFO
Pt lives home with family PTA, alert, oriented, well-communicated; appetite good, unsure recent wt changes; denied GI distress, chewing or swallowing problem at present, no specific food choices reported; Pending discharge planning to skilled nursing facility for rehab when medically ready

## 2022-05-02 NOTE — PROGRESS NOTE ADULT - ASSESSMENT
Confidential Drug Utilization Report  Search Terms: Geovany De Santiago, 1990   Search Date: 04/28/2022 12:20:29 PM   The Drug Utilization Report below displays all of the controlled substance prescriptions, if any, that your patient has filled in the last twelve months. The information displayed on this report is compiled from pharmacy submissions to the Department, and accurately reflects the information as submitted by the pharmacies.  This report was requested by: Judy Mooney | Reference #: [id number]   There are no results for the search terms that you entered.

## 2022-05-02 NOTE — H&P ADULT - NSHPREVIEWOFSYSTEMS_GEN_ALL_CORE
CONSTITUTIONAL: No fever, weight loss, or fatigue  EYES: No eye pain, visual disturbances, or discharge  ENMT:  No difficulty hearing, tinnitus, vertigo; No sinus or throat pain  NECK: No pain or stiffness  BREASTS: No pain, masses, or nipple discharge  RESPIRATORY: No cough, wheezing, chills or hemoptysis; No shortness of breath  CARDIOVASCULAR: No chest pain, palpitations, dizziness, or leg swelling  GASTROINTESTINAL: No abdominal or epigastric pain. No nausea, vomiting, or hematemesis; No diarrhea or constipation. No melena or hematochezia.  GENITOURINARY: No dysuria, frequency, hematuria, or incontinence  NEUROLOGICAL: No headaches, memory loss, loss of strength, numbness, or tremors  SKIN: No itching, burning, rashes, or lesions   LYMPH NODES: No enlarged glands  ENDOCRINE: No heat or cold intolerance; No hair loss  MUSCULOSKELETAL: No joint pain or swelling; No muscle, back, or extremity pain  PSYCHIATRIC: No depression, anxiety, mood swings, or difficulty sleeping  HEME/LYMPH: No easy bruising, or bleeding gums  ALLERY AND IMMUNOLOGIC: No hives or eczema CONSTITUTIONAL: No fever, weight loss, or fatigue  EYES: No eye pain, visual disturbances, or discharge  ENMT:  No difficulty hearing, tinnitus, vertigo; No sinus or throat pain  NECK: No pain or stiffness  RESPIRATORY: No cough, wheezing, chills or hemoptysis; No shortness of breath  CARDIOVASCULAR: No chest pain, palpitations, dizziness, or leg swelling  GASTROINTESTINAL: No abdominal or epigastric pain. No nausea, vomiting, or hematemesis; no diarrhea or constipation- last BM today. No melena or hematochezia.  GENITOURINARY: No dysuria, frequency, hematuria, or incontinence  NEUROLOGICAL: No headaches, memory loss, loss of strength, numbness, or tremors  SKIN: No itching, burning, rashes, or lesions   LYMPH NODES: No enlarged glands  ENDOCRINE: No heat or cold intolerance; No hair loss  MUSCULOSKELETAL: + Maynor knee pain; No muscle, back, or extremity pain  PSYCHIATRIC: No depression, anxiety, mood swings, or difficulty sleeping  HEME/LYMPH: No easy bruising, or bleeding gums  ALLERY AND IMMUNOLOGIC: No hives or eczema

## 2022-05-02 NOTE — DIETITIAN INITIAL EVALUATION ADULT - PERTINENT MEDS FT
MEDICATIONS  (STANDING):  ascorbic acid 500 milliGRAM(s) Oral two times a day  celecoxib 200 milliGRAM(s) Oral daily  enoxaparin Injectable 30 milliGRAM(s) SubCutaneous every 12 hours  famotidine    Tablet 20 milliGRAM(s) Oral every 12 hours  folic acid 1 milliGRAM(s) Oral daily  multivitamin 1 Tablet(s) Oral daily  mupirocin 2% Ointment 1 Application(s) Both Nostrils two times a day  polyethylene glycol 3350 17 Gram(s) Oral daily  senna 2 Tablet(s) Oral at bedtime  traMADol 50 milliGRAM(s) Oral every 8 hours    MEDICATIONS  (PRN):  acetaminophen     Tablet .. 1000 milliGRAM(s) Oral every 8 hours PRN Moderate Pain (4 - 6)  oxyCODONE    IR 5 milliGRAM(s) Oral every 4 hours PRN Severe Pain (7 - 10)

## 2022-05-02 NOTE — DISCHARGE NOTE NURSING/CASE MANAGEMENT/SOCIAL WORK - PATIENT PORTAL LINK FT
You can access the FollowMyHealth Patient Portal offered by [facility] by registering at the following website: [email or web address]. By joining [employer]’s FollowMyHealth portal, you will also be able to view your health information using other applications (apps) compatible with our system.

## 2022-05-02 NOTE — H&P ADULT - NSHPSOCIALHISTORY_GEN_ALL_CORE
FUNCTIONAL STATUS  Bed Mobility- CG  Transfers - unable  Ambulation - unable smoking- neg  ETOH- social  drugs- neg    Lives with wife and child in [address] [place]- single rail- Independent with ADL and ambulation PTA    FUNCTIONAL STATUS  Bed Mobility- Mod A X2  Transfers - Mod A X2  Ambulation - 10' RW Min A    LB ADL- dependent

## 2022-05-02 NOTE — PROGRESS NOTE ADULT - PROVIDER SPECIALTY LIST ADULT
Internal Medicine
Orthopedics
Internal Medicine
Internal Medicine
Orthopedics
Orthopedics
Internal Medicine
Pain Medicine

## 2022-05-02 NOTE — PROGRESS NOTE ADULT - SUBJECTIVE AND OBJECTIVE BOX
31yMale    Diagnosis:  S/p Bilateral patellar tendon repair with NIKKI of Right knee POD#5    Patient was seen and evaluated at bedside.   Patient complaining of decreased sensation of the RLE distal to the incision in both anterior and posterior parts of the leg, but improving today - he says it is now minimal  Pain is  controlled   Denies CP/SOB, dyspnea, paresthesias, N/V/D, palpitations.       Vital Signs Last 24 Hrs  T(C): 37.1 (05-02-22 @ 05:20), Max: 37.2 (05-01-22 @ 14:27)  T(F): 98.8 (05-02-22 @ 05:20), Max: 98.9 (05-01-22 @ 14:27)  HR: 94 (05-02-22 @ 05:20) (94 - 116)  BP: 118/79 (05-02-22 @ 05:20) (118/79 - 142/71)  BP(mean): --  RR: 18 (05-02-22 @ 05:20) (17 - 18)  SpO2: 98% (05-02-22 @ 05:20) (98% - 100%)        Physical Exam:    General: AAOx3, NAD, resting comfortably in bed.    Bilateral knee:  Dressing is C/D/I. with denny locked in full extension b/l.   Skin is pink and warm. SILT ON LLE.. minimally decreased sensation on the RLE.  No drainage.   Lower extremities:  No calf tenderness, calves are soft. 2+pulses. NVI. 5/5 Strength of EHL/TA/gastrocnemius B/L.  Good capillary refill.                                        15.3   12.59 )-----------( 371      ( 01 May 2022 05:52 )             46.5   05-01    136  |  102  |  18  ----------------------------<  107<H>  4.2   |  26  |  1.07    Ca    9.7      01 May 2022 05:52        Impression:  31yMale S/p Bilateral patellar tendon repair with NIKKI of Right knee POD#5  Plan:  -  Pain management  -  Dvt prophylaxis with Lovenox  -  Daily Physical Therapy:  WBAT of the BLLE with denny braces locked in full extension  -  Discharge planning: acute rehab  - Orthopedically stable for discharge  -  Case d/w Dr. Coffey

## 2022-05-02 NOTE — PROGRESS NOTE ADULT - SUBJECTIVE AND OBJECTIVE BOX
Source of information: YANCI QURESHI, Chart review  Patient language: English  : n/a    HPI:  32 yo m no PMHx and PSHx of right knee ORIF is presenting with bilateral knee pain. Pt was playing basketball, attempted to jump but fell onto his knees. He experienced severe pain and was unable to ambulate. He denies head trauma, CP, SOB, dizziness, paresthesias or cold extremities.     In ED: CT shows concern for patellar ligament rupture in L knee and fractured hardware in R knee with small hematoma. Ortho was consulted by ED provider, no acute intervention at this time, will reassess in a.m. (24 Apr 2022 18:44)      Pt s/p b/l patellar tendon repair 4/27 POD #5. Pain consulted for b/l knee pain. Pt seen and examined at bedside. Pt laying in bed, reports b/l knee pain, 2-3/10 and tolerable. Reports current pain regimen is alleviating his pain. Pt describes pain as aching, non- radiating, not alleviated by current pain medication, exacerbated by movement. Pt also reports right foot numbness/tingling, improving. Pt tolerating PO diet. Denies lethargy, chest pain, SOB, abdominal pain, nausea, vomiting, constipation, itchiness. Reports last BM 5/1.Patient stated goal for pain control: to be able to take deep breaths and turning in bed with tolerable pain control. Reports he was able to stand and take a couple steps with PT. Pt denies taking medications for pain at home. Plan is for discharge to [facility].     PAST MEDICAL & SURGICAL HISTORY:  History of open reduction and internal fixation (ORIF) procedure  Right knee        FAMILY HISTORY:      Social History:   [X ] Denies ETOH use, illicit drug use and smoking    Allergies    No Known Allergies      MEDICATIONS  (STANDING):  ascorbic acid 500 milliGRAM(s) Oral two times a day  celecoxib 200 milliGRAM(s) Oral daily  enoxaparin Injectable 30 milliGRAM(s) SubCutaneous every 12 hours  famotidine    Tablet 20 milliGRAM(s) Oral every 12 hours  folic acid 1 milliGRAM(s) Oral daily  multivitamin 1 Tablet(s) Oral daily  mupirocin 2% Ointment 1 Application(s) Both Nostrils two times a day  polyethylene glycol 3350 17 Gram(s) Oral daily  senna 2 Tablet(s) Oral at bedtime  traMADol 50 milliGRAM(s) Oral every 8 hours    MEDICATIONS  (PRN):  acetaminophen     Tablet .. 1000 milliGRAM(s) Oral every 8 hours PRN Moderate Pain (4 - 6)  oxyCODONE    IR 5 milliGRAM(s) Oral every 4 hours PRN Severe Pain (7 - 10)      Vital Signs Last 24 Hrs  T(C): 37.1 (02 May 2022 05:20), Max: 37.2 (01 May 2022 14:27)  T(F): 98.8 (02 May 2022 05:20), Max: 98.9 (01 May 2022 14:27)  HR: 94 (02 May 2022 05:20) (94 - 116)  BP: 118/79 (02 May 2022 05:20) (118/79 - 142/71)  BP(mean): --  RR: 18 (02 May 2022 05:20) (17 - 18)  SpO2: 98% (02 May 2022 05:20) (98% - 100%)  COVID-19 PCR: NotDetec (01 May 2022 07:59)  COVID-19 PCR: NotDetec (24 Apr 2022 13:34)    LABS: Reviewed                          15.3   12.59 )-----------( 371      ( 01 May 2022 05:52 )             46.5     05-01    136  |  102  |  18  ----------------------------<  107<H>  4.2   |  26  |  1.07    Ca    9.7      01 May 2022 05:52            CAPILLARY BLOOD GLUCOSE        COVID-19 PCR: NotDetec (01 May 2022 07:59)  COVID-19 PCR: NotDetec (24 Apr 2022 13:34)    Radiology: Reviewed.   < from: CT Knee No Cont, Bilateral (04.24.22 @ 14:07) >    ACC: [id number] EXAM:  CT 3D RECONSTRUCT CHELE MARTÍNEZ                        ACC: [id number] EXAM:  CT KNEE ONLY BI                          PROCEDURE DATE:  04/24/2022          INTERPRETATION:  CT OF THE BILATERAL KNEES    CLINICAL INFORMATION: Bilateral knee pain after playing basketball.   Evaluate for bilateral knee fractures.  TECHNIQUE: Multidetector CT of the bilateral knees. The study was   performed without the use of intravenous or intra-articular contrast.   Multiplanar reformats were generated for review. Three dimensional   reconstructions were obtained on an independent workstation. The   interpretation of these images is included in the body of the report of   the main portion of the study.    COMPARISON: Bilateral knee radiographs 4/24/2022.    FINDINGS:    RIGHT KNEE:    HARDWARE: Patient appears to be status post repair of the extensor   mechanism using cerclage wire. The wire is fractured in multiple places.    BONE: No acute fracture. No focal lytic or blastic lesions. Postsurgical   changes in the anterior aspect of the patella and tibia.    JOINTS: No dislocation. No knee joint effusion or lipohemarthrosis.   Cartilage spaces are maintained.    SOFT TISSUE: No focal muscle atrophy. Neurovascular structures are normal   in course and caliber. Edema in the subcutaneous fat along the anterior   aspect of the knee joint.      LEFT KNEE:    BONE: No acute fracture    JOINTS: No dislocation. No joint effusion or lipohemarthrosis.    SOFT TISSUE: Irregular appearance of the proximal patellar tendon   suspicious for tear. Mild amount of edema is seen in the surrounding   subcutaneous fat. No focal muscle atrophy. Neurovascular structures are   normal in course and caliber.      IMPRESSION:  1.  No acute fracture ordislocation.  2.  On the left, there is an irregular appearance of the proximal   patellar tendon concerning for patellar tendon tear. Consider   confirmation with ultrasound or MRI.  3.  Chronic postsurgical repair of the right extensor mechanism. Hardware   is fractured in multiple places, as described above.  4.  Edema in the subcutaneous fat along the anterior aspect of the right   knee suspicious for small hematoma.    --- End of Report ---            MONICO LIU MD; Attending Radiologist  This document has been electronically signed. Apr 24 2022  3:32PM    < end of copied text >      ORT Score -   Family Hx of substance abuse	Female	      Male  Alcohol 	                                           1                     3  Illegal drugs	                                   2                     3  Rx drugs                                           4 	                  4  Personal Hx of substance abuse		  Alcohol 	                                          3	                  3  Illegal drugs                                     4	                  4  Rx drugs                                            5 	                  5  Age between 16- 45 years	           1                     1  hx preadolescent sexual abuse	   3 	                  0  Psychological disease		  ADD, OCD, bipolar, schizophrenia   2	          2  Depression                                           1 	          1  Total: 1    a score of 3 or lower indicates low risk for opioid abuse		  a score of 4-7 indicates moderate risk for opioid abuse		  a score of 8 or higher indicates high risk for opioid abuse    REVIEW OF SYSTEMS:  CONSTITUTIONAL: No fever or fatigue  HEENT:  No difficulty hearing, no change in vision  NECK: No pain or stiffness  RESPIRATORY: No cough, wheezing, chills or hemoptysis; No shortness of breath  CARDIOVASCULAR: No chest pain, palpitations, dizziness, or leg swelling  GASTROINTESTINAL: No loss of appetite, decreased PO intake. No abdominal or epigastric pain. No nausea, vomiting; No diarrhea or constipation.   GENITOURINARY: No dysuria, frequency, hematuria, retention or incontinence  MUSCULOSKELETAL: + b/l knee joint pain and swelling; No back pain, no upper or lower motor strength weakness, no saddle anesthesia, bowel/bladder incontinence, + hx fall   NEURO: No headaches, + right foot numbness/tingling (improving), No weakness    PHYSICAL EXAM:  GENERAL:  Alert & Oriented X4, cooperative, NAD, Good concentration. Speech is clear.   RESPIRATORY: Respirations even and unlabored. Clear to auscultation bilaterally; No rales, rhonchi, wheezing, or rubs  CARDIOVASCULAR: Normal S1/S2, regular rate and rhythm; No murmurs, rubs, or gallops. No JVD.   GASTROINTESTINAL:  Soft, Nontender, Nondistended; Bowel sounds present  PERIPHERAL VASCULAR: Extremities warm with b/l knee edema. 2+ Peripheral Pulses, No cyanosis, No calf tenderness  MUSCULOSKELETAL: + b/l knee immobilizers in place. Motor Strength 5/5 B/L upper and lower extremities; + decreased b/l LE ROM + b/l knee pain tenderness on palpation   SKIN: Warm, dry, intact. No rashes, lesions, scars or wounds. + b/l knee ace wrap c/d/i    Risk factors associated with adverse outcomes related to opioid treatment  [ ]  Concurrent benzodiazepine use  [ ]  History/ Active substance use or alcohol use disorder  [ ] Psychiatric co-morbidity  [ ] Sleep apnea  [ ] COPD  [ ] BMI> 35  [ ] Liver dysfunction  [ ] Renal dysfunction  [ ] CHF  [ ] Smoker  [ ]  Age > 60 years    [X ]  [place]  Reviewed and Copied to Chart. See below.    Plan of care and goal oriented pain management treatment options were discussed with patient and /or primary care giver; all questions and concerns were addressed and care was aligned with patient's wishes.    Educated patient on goal oriented pain management treatment options     05-02-22 @ 13:07

## 2022-05-02 NOTE — PROGRESS NOTE ADULT - PROBLEM SELECTOR PLAN 1
Pt with b/l knee pain which is somatic in nature due to b/l patellar tendon repair 4/27 POD #5.    Opioid pain recommendations   - Continue Tramadol 50mg PO q 8 hours. Monitor for sedation/ respiratory depression.   - Decrease oxycodone 5 mg PO q 4 hours PRN severe pain. Monitor for sedation/ respiratory depression.   Non-opioid pain recommendations   - NSIADs per surgical team.   - Acetaminophen 1 gram PO q 6 hours x 3 days completed, now PRN moderate pain. Monitor LFTs  Bowel Regimen  - Continue Miralax 17G PO daily  - Continue Senna 2 tablets at bedtime for constipation  Mild pain   - Non-pharmacological pain treatment recommendations  - Warm/ Cool packs PRN   - Repositioning, elevation, imagery, relaxation, distraction.  - Physical therapy OOB if no contraindications   Recommendations discussed with primary team and RN.

## 2022-05-03 DIAGNOSIS — S76.112A STRAIN OF LEFT QUADRICEPS MUSCLE, FASCIA AND TENDON, INITIAL ENCOUNTER: ICD-10-CM

## 2022-05-03 LAB
ALBUMIN SERPL ELPH-MCNC: 3.3 G/DL — SIGNIFICANT CHANGE UP (ref 3.3–5)
ALP SERPL-CCNC: 94 U/L — SIGNIFICANT CHANGE UP (ref 40–120)
ALT FLD-CCNC: 69 U/L — HIGH (ref 10–45)
ANION GAP SERPL CALC-SCNC: 11 MMOL/L — SIGNIFICANT CHANGE UP (ref 5–17)
AST SERPL-CCNC: 43 U/L — HIGH (ref 10–40)
BASOPHILS # BLD AUTO: 0.04 K/UL — SIGNIFICANT CHANGE UP (ref 0–0.2)
BASOPHILS NFR BLD AUTO: 0.4 % — SIGNIFICANT CHANGE UP (ref 0–2)
BILIRUB SERPL-MCNC: 0.5 MG/DL — SIGNIFICANT CHANGE UP (ref 0.2–1.2)
BUN SERPL-MCNC: 26 MG/DL — HIGH (ref 7–23)
CALCIUM SERPL-MCNC: 9.4 MG/DL — SIGNIFICANT CHANGE UP (ref 8.4–10.5)
CHLORIDE SERPL-SCNC: 98 MMOL/L — SIGNIFICANT CHANGE UP (ref 96–108)
CO2 SERPL-SCNC: 27 MMOL/L — SIGNIFICANT CHANGE UP (ref 22–31)
CREAT SERPL-MCNC: 1.17 MG/DL — SIGNIFICANT CHANGE UP (ref 0.5–1.3)
EGFR: 85 ML/MIN/1.73M2 — SIGNIFICANT CHANGE UP
EOSINOPHIL # BLD AUTO: 0.29 K/UL — SIGNIFICANT CHANGE UP (ref 0–0.5)
EOSINOPHIL NFR BLD AUTO: 2.6 % — SIGNIFICANT CHANGE UP (ref 0–6)
GLUCOSE SERPL-MCNC: 104 MG/DL — HIGH (ref 70–99)
HCT VFR BLD CALC: 47.3 % — SIGNIFICANT CHANGE UP (ref 39–50)
HGB BLD-MCNC: 15.1 G/DL — SIGNIFICANT CHANGE UP (ref 13–17)
IMM GRANULOCYTES NFR BLD AUTO: 0.9 % — SIGNIFICANT CHANGE UP (ref 0–1.5)
LYMPHOCYTES # BLD AUTO: 2.48 K/UL — SIGNIFICANT CHANGE UP (ref 1–3.3)
LYMPHOCYTES # BLD AUTO: 22.3 % — SIGNIFICANT CHANGE UP (ref 13–44)
MCHC RBC-ENTMCNC: 26.1 PG — LOW (ref 27–34)
MCHC RBC-ENTMCNC: 31.9 GM/DL — LOW (ref 32–36)
MCV RBC AUTO: 81.7 FL — SIGNIFICANT CHANGE UP (ref 80–100)
MONOCYTES # BLD AUTO: 1.01 K/UL — HIGH (ref 0–0.9)
MONOCYTES NFR BLD AUTO: 9.1 % — SIGNIFICANT CHANGE UP (ref 2–14)
NEUTROPHILS # BLD AUTO: 7.18 K/UL — SIGNIFICANT CHANGE UP (ref 1.8–7.4)
NEUTROPHILS NFR BLD AUTO: 64.7 % — SIGNIFICANT CHANGE UP (ref 43–77)
NRBC # BLD: 0 /100 WBCS — SIGNIFICANT CHANGE UP (ref 0–0)
PLATELET # BLD AUTO: 412 K/UL — HIGH (ref 150–400)
POTASSIUM SERPL-MCNC: 4.2 MMOL/L — SIGNIFICANT CHANGE UP (ref 3.5–5.3)
POTASSIUM SERPL-SCNC: 4.2 MMOL/L — SIGNIFICANT CHANGE UP (ref 3.5–5.3)
PROT SERPL-MCNC: 8.3 G/DL — SIGNIFICANT CHANGE UP (ref 6–8.3)
RBC # BLD: 5.79 M/UL — SIGNIFICANT CHANGE UP (ref 4.2–5.8)
RBC # FLD: 13.1 % — SIGNIFICANT CHANGE UP (ref 10.3–14.5)
SODIUM SERPL-SCNC: 136 MMOL/L — SIGNIFICANT CHANGE UP (ref 135–145)
WBC # BLD: 11.1 K/UL — HIGH (ref 3.8–10.5)
WBC # FLD AUTO: 11.1 K/UL — HIGH (ref 3.8–10.5)

## 2022-05-03 PROCEDURE — 99223 1ST HOSP IP/OBS HIGH 75: CPT

## 2022-05-03 PROCEDURE — 99232 SBSQ HOSP IP/OBS MODERATE 35: CPT | Mod: GC

## 2022-05-03 RX ADMIN — TRAMADOL HYDROCHLORIDE 50 MILLIGRAM(S): 50 TABLET ORAL at 17:27

## 2022-05-03 RX ADMIN — TRAMADOL HYDROCHLORIDE 50 MILLIGRAM(S): 50 TABLET ORAL at 07:31

## 2022-05-03 RX ADMIN — Medication 975 MILLIGRAM(S): at 09:46

## 2022-05-03 RX ADMIN — MUPIROCIN 1 APPLICATION(S): 20 OINTMENT TOPICAL at 22:00

## 2022-05-03 RX ADMIN — CELECOXIB 200 MILLIGRAM(S): 200 CAPSULE ORAL at 13:42

## 2022-05-03 RX ADMIN — POLYETHYLENE GLYCOL 3350 17 GRAM(S): 17 POWDER, FOR SOLUTION ORAL at 12:27

## 2022-05-03 RX ADMIN — ENOXAPARIN SODIUM 40 MILLIGRAM(S): 100 INJECTION SUBCUTANEOUS at 07:35

## 2022-05-03 RX ADMIN — Medication 975 MILLIGRAM(S): at 10:26

## 2022-05-03 RX ADMIN — TRAMADOL HYDROCHLORIDE 50 MILLIGRAM(S): 50 TABLET ORAL at 23:08

## 2022-05-03 RX ADMIN — TRAMADOL HYDROCHLORIDE 50 MILLIGRAM(S): 50 TABLET ORAL at 22:01

## 2022-05-03 RX ADMIN — TRAMADOL HYDROCHLORIDE 50 MILLIGRAM(S): 50 TABLET ORAL at 14:45

## 2022-05-03 RX ADMIN — MUPIROCIN 1 APPLICATION(S): 20 OINTMENT TOPICAL at 05:24

## 2022-05-03 RX ADMIN — TRAMADOL HYDROCHLORIDE 50 MILLIGRAM(S): 50 TABLET ORAL at 05:22

## 2022-05-03 RX ADMIN — CELECOXIB 200 MILLIGRAM(S): 200 CAPSULE ORAL at 12:26

## 2022-05-03 NOTE — DIETITIAN INITIAL EVALUATION ADULT - OTHER INFO
Initial Nutrition Assessment   31yr Old Male   Denies Food Allergy/Intolerance  Tolerates Diet Well - No Chewing/Swallowing Complications (Per Patient)  Consumed 50-75% of 1st Meals (as Per Documentation)  Surgical Incision on Bilateral Knees & No Pressure Ulcers (as Per Nursing Flow Sheets)  No Edema Noted (as Per Nursing Flow Sheets)  No Recent Nausea/Vomiting/Diarrhea/Constipation (as Per Patient)

## 2022-05-03 NOTE — DIETITIAN INITIAL EVALUATION ADULT - ORAL INTAKE PTA/DIET HISTORY
Patient Does Not Follow Diet @Home But Has Recently Been Trying to Decreased Calorie Consumed (2,000kcal)  Consumes 2 Meals a Day with Occasionally Snack,  Doesn't Take Vitamin/Supplements @Home

## 2022-05-03 NOTE — PROGRESS NOTE ADULT - SUBJECTIVE AND OBJECTIVE BOX
Chief complaint: Maynor Knee pain, weakness    30 yo male w/ PSHx of right knee ORIF to ED [facility] on 4/24 w/ bilateral knee pain. Earlier that day playing basketball, attempted to jump but fell onto his knees. He experienced severe pain and was unable to ambulate. In ED: CT shows concern for patellar ligament rupture in L knee and fractured hardware in R knee with small hematoma. Ortho was consulted. Underwent bilateral knee tendon reconstruction 4/27. No complications. WBAT [place]. Pt was evaluated by PT with recommendation for acute rehab. Cleared for dc on 5/2.    Subjective/ROS:  moved bowels yesterday  Pain controlled at present  no dizziness, no headaches  no Nausea, no Vomiting  No SOB, No Chest pain    MEDICATIONS  (STANDING):  celecoxib 200 milliGRAM(s) Oral daily  enoxaparin Injectable 40 milliGRAM(s) SubCutaneous <User Schedule>  mupirocin 2% Ointment 1 Application(s) Topical two times a day  polyethylene glycol 3350 17 Gram(s) Oral daily  traMADol 50 milliGRAM(s) Oral every 8 hours    MEDICATIONS  (PRN):  acetaminophen     Tablet .. 975 milliGRAM(s) Oral every 8 hours PRN Moderate Pain (4 - 6)  famotidine    Tablet 20 milliGRAM(s) Oral two times a day PRN Dyspepsia  senna 2 Tablet(s) Oral at bedtime PRN Constipation                            15.1   11.10 )-----------( 412      ( 03 May 2022 06:30 )             47.3     05-03    136  |  98  |  26<H>  ----------------------------<  104<H>  4.2   |  27  |  1.17    Ca    9.4      03 May 2022 06:30    TPro  8.3  /  Alb  3.3  /  TBili  0.5  /  DBili  x   /  AST  43<H>  /  ALT  69<H>  /  AlkPhos  94  05-03        CAPILLARY BLOOD GLUCOSE          Vital Signs Last 24 Hrs  T(C): 37.1 (03 May 2022 07:33), Max: 37.1 (02 May 2022 15:36)  T(F): 98.7 (03 May 2022 07:33), Max: 98.8 (02 May 2022 15:36)  HR: 104 (03 May 2022 07:33) (104 - 118)  BP: 132/81 (03 May 2022 07:33) (129/89 - 132/81)  BP(mean): 93 (02 May 2022 14:10) (93 - 93)  RR: 16 (03 May 2022 07:33) (16 - 20)  SpO2: 98% (03 May 2022 07:33) (97% - 100%)    General: NAD, Resting Comfortable,                                  HEENT: NC/AT, EOMI  Cardio: RRR, Normal S1-S2, No M/G/R                              Pulm: No Respiratory Distress,  Lungs CTAB                        Abdomen: ND/NT, Soft, BS+                                                                                   Ext: No edema, No calf tenderness  Maynor LES with ace/aquacel/ locked bledsoes  Skin: aquacel both knees without drainage                                                             Wounds: none  Decubitus Ulcers: None Present     Neurological Examination    Cognitive: AAO x 3                                                                         CN II - XII  intact                                                                     Sensory: Intact to light touch                                                                                           Motor    LEFT    UE: SF [5/5], EF [5/5], EE [5/5], WE [5/5],  [wnl]  RIGHT UE: SF [5/5], EF [5/5], EE [5/5], WE [5/5],  [wnl]  LEFT    LE:  HF [NT], KE [NT], DF [5/5], EHL [5/5],  PF [5/5]  RIGHT LE:  HF [NT], KE [NT], DF [5/5], EHL [5/5],  PF [5/5]      Reflex:  2 +UES, ankle maynor

## 2022-05-03 NOTE — PROGRESS NOTE ADULT - SUBJECTIVE AND OBJECTIVE BOX
Patient is a 31y old  Male who presents with a chief complaint of S/p Bilateral patellar tendon repair with NIKKI (02 May 2022 13:12)    HPI:  32 yo male w/ PSHx of right knee ORIF to ED [facility] on 4/24 w/ bilateral knee pain. Earlier that day playing basketball, attempted to jump but fell onto his knees. He experienced severe pain and was unable to ambulate. In ED: CT shows concern for patellar ligament rupture in L knee and fractured hardware in R knee with small hematoma. Ortho was consulted. Underwent bilateral knee tendon reconstruction 4/27. No complications. WBAT [place]. Pt was evaluated by PT with recommendation for acute rehab. Cleared for dc on 5/2.  (02 May 2022 13:12)    Patient seen and examined at bedside, stable, NAD. c/o b/l knee pain, controlled on current regimen. denies headache, fever, chills, cp, sob, n/v, abd pain.    PAST MEDICAL & SURGICAL HISTORY:  History of open reduction and internal fixation (ORIF) procedure  Right knee    SOCIAL HISTORY: Denies tobacco, EtOH, or illicit drug use. PTA independent in ADLs, IADLs, and ambulation. Employed as .    FAMILY HISTORY: Mother alive, age 50s, denies PMH. Father alive, age 60s, denies PMH    ALLERGIES:  No Known Allergies    MEDICATIONS  (STANDING):  celecoxib 200 milliGRAM(s) Oral daily  enoxaparin Injectable 40 milliGRAM(s) SubCutaneous <User Schedule>  mupirocin 2% Ointment 1 Application(s) Topical two times a day  polyethylene glycol 3350 17 Gram(s) Oral daily  traMADol 50 milliGRAM(s) Oral every 8 hours    MEDICATIONS  (PRN):  acetaminophen     Tablet .. 975 milliGRAM(s) Oral every 8 hours PRN Moderate Pain (4 - 6)  famotidine    Tablet 20 milliGRAM(s) Oral two times a day PRN Dyspepsia  senna 2 Tablet(s) Oral at bedtime PRN Constipation    Review of Systems: Refer to HPI for pertinent positives and negatives. All other ROS reviewed and negative except as otherwise stated above.    Vital Signs Last 24 Hrs  T(F): 98.7 (03 May 2022 07:33), Max: 98.8 (02 May 2022 15:36)  HR: 104 (03 May 2022 07:33) (104 - 118)  BP: 132/81 (03 May 2022 07:33) (129/89 - 132/81)  RR: 16 (03 May 2022 07:33) (16 - 20)  SpO2: 98% (03 May 2022 07:33) (97% - 100%)  I&O's Summary    PHYSICAL EXAM:  GENERAL: NAD, well-groomed, well-developed  HEAD:  Atraumatic, Normocephalic  EYES: EOMI, PERRL, conjunctiva and sclera clear  ENMT: Moist mucous membranes, Good dentition  NECK: Supple, No JVD  CHEST/LUNG: Clear to auscultation bilaterally, non-labored breathing, good air entry  HEART: RRR; S1/S2  ABDOMEN: Soft, Nontender, Nondistended; Bowel sounds present  VASCULAR: Normal pulses, Normal capillary refill  EXTREMITIES: No cyanosis, No edema. + b/l knee avril braces  LYMPH: No lymphadenopathy noted  SKIN: Warm, Intact  PSYCH: Normal mood and affect  NERVOUS SYSTEM:  A/O x3, Good concentration; CN 2-12 intact, No focal deficits, moves all extremities    LABS:                        15.1   11.10 )-----------( 412      ( 03 May 2022 06:30 )             47.3     05-03    136  |  98  |  26  ----------------------------<  104  4.2   |  27  |  1.17    Ca    9.4      03 May 2022 06:30    TPro  8.3  /  Alb  3.3  /  TBili  0.5  /  DBili  x   /  AST  43  /  ALT  69  /  AlkPhos  94  05-03    COVID-19 PCR: NotDetec (05-01-22 @ 07:59)  COVID-19 PCR: NotDetec (04-24-22 @ 13:34)    RADIOLOGY & ADDITIONAL TESTS: reviewed    Care Discussed with Consultants/Other Providers: yes

## 2022-05-03 NOTE — DIETITIAN INITIAL EVALUATION ADULT - NS FNS DIET ORDER
Regular Diet (IDDSI Level 7) w/ Thin Liquids (IDDSI Level 0)   Education Provided on Proper Nutrition

## 2022-05-03 NOTE — DIETITIAN INITIAL EVALUATION ADULT - PERTINENT MEDS FT
MEDICATIONS  (STANDING):  celecoxib 200 milliGRAM(s) Oral daily  enoxaparin Injectable 40 milliGRAM(s) SubCutaneous <User Schedule>  mupirocin 2% Ointment 1 Application(s) Topical two times a day  polyethylene glycol 3350 17 Gram(s) Oral daily  traMADol 50 milliGRAM(s) Oral every 8 hours    MEDICATIONS  (PRN):  acetaminophen     Tablet .. 975 milliGRAM(s) Oral every 8 hours PRN Moderate Pain (4 - 6)  famotidine    Tablet 20 milliGRAM(s) Oral two times a day PRN Dyspepsia  senna 2 Tablet(s) Oral at bedtime PRN Constipation

## 2022-05-03 NOTE — DIETITIAN INITIAL EVALUATION ADULT - REASON
Nutrition Physical Assessment Not Warranted - No Overt Visual Signs of Muscle Depletion or Fat Loss

## 2022-05-03 NOTE — DIETITIAN INITIAL EVALUATION ADULT - PERTINENT LABORATORY DATA
05-03    136  |  98  |  26<H>  ----------------------------<  104<H>  4.2   |  27  |  1.17    Ca    9.4      03 May 2022 06:30    TPro  8.3  /  Alb  3.3  /  TBili  0.5  /  DBili  x   /  AST  43<H>  /  ALT  69<H>  /  AlkPhos  94  05-03

## 2022-05-04 LAB
ALBUMIN SERPL ELPH-MCNC: 3.3 G/DL — SIGNIFICANT CHANGE UP (ref 3.3–5)
ALP SERPL-CCNC: 92 U/L — SIGNIFICANT CHANGE UP (ref 40–120)
ALT FLD-CCNC: 58 U/L — HIGH (ref 10–45)
ANION GAP SERPL CALC-SCNC: 10 MMOL/L — SIGNIFICANT CHANGE UP (ref 5–17)
AST SERPL-CCNC: 37 U/L — SIGNIFICANT CHANGE UP (ref 10–40)
BILIRUB SERPL-MCNC: 0.4 MG/DL — SIGNIFICANT CHANGE UP (ref 0.2–1.2)
BUN SERPL-MCNC: 22 MG/DL — SIGNIFICANT CHANGE UP (ref 7–23)
CALCIUM SERPL-MCNC: 9.4 MG/DL — SIGNIFICANT CHANGE UP (ref 8.4–10.5)
CHLORIDE SERPL-SCNC: 102 MMOL/L — SIGNIFICANT CHANGE UP (ref 96–108)
CO2 SERPL-SCNC: 28 MMOL/L — SIGNIFICANT CHANGE UP (ref 22–31)
CREAT SERPL-MCNC: 1.1 MG/DL — SIGNIFICANT CHANGE UP (ref 0.5–1.3)
EGFR: 92 ML/MIN/1.73M2 — SIGNIFICANT CHANGE UP
GLUCOSE SERPL-MCNC: 96 MG/DL — SIGNIFICANT CHANGE UP (ref 70–99)
HCT VFR BLD CALC: 46.9 % — SIGNIFICANT CHANGE UP (ref 39–50)
HGB BLD-MCNC: 15.4 G/DL — SIGNIFICANT CHANGE UP (ref 13–17)
MCHC RBC-ENTMCNC: 26.9 PG — LOW (ref 27–34)
MCHC RBC-ENTMCNC: 32.8 GM/DL — SIGNIFICANT CHANGE UP (ref 32–36)
MCV RBC AUTO: 82 FL — SIGNIFICANT CHANGE UP (ref 80–100)
NRBC # BLD: 0 /100 WBCS — SIGNIFICANT CHANGE UP (ref 0–0)
PLATELET # BLD AUTO: 404 K/UL — HIGH (ref 150–400)
POTASSIUM SERPL-MCNC: 4.3 MMOL/L — SIGNIFICANT CHANGE UP (ref 3.5–5.3)
POTASSIUM SERPL-SCNC: 4.3 MMOL/L — SIGNIFICANT CHANGE UP (ref 3.5–5.3)
PROT SERPL-MCNC: 8.5 G/DL — HIGH (ref 6–8.3)
RBC # BLD: 5.72 M/UL — SIGNIFICANT CHANGE UP (ref 4.2–5.8)
RBC # FLD: 13.1 % — SIGNIFICANT CHANGE UP (ref 10.3–14.5)
SODIUM SERPL-SCNC: 140 MMOL/L — SIGNIFICANT CHANGE UP (ref 135–145)
WBC # BLD: 10.54 K/UL — HIGH (ref 3.8–10.5)
WBC # FLD AUTO: 10.54 K/UL — HIGH (ref 3.8–10.5)

## 2022-05-04 PROCEDURE — 99233 SBSQ HOSP IP/OBS HIGH 50: CPT | Mod: GC

## 2022-05-04 PROCEDURE — 99232 SBSQ HOSP IP/OBS MODERATE 35: CPT

## 2022-05-04 RX ORDER — ACETAMINOPHEN 500 MG
650 TABLET ORAL EVERY 8 HOURS
Refills: 0 | Status: DISCONTINUED | OUTPATIENT
Start: 2022-05-04 | End: 2022-05-11

## 2022-05-04 RX ORDER — TRAMADOL HYDROCHLORIDE 50 MG/1
50 TABLET ORAL THREE TIMES A DAY
Refills: 0 | Status: DISCONTINUED | OUTPATIENT
Start: 2022-05-04 | End: 2022-05-10

## 2022-05-04 RX ADMIN — MUPIROCIN 1 APPLICATION(S): 20 OINTMENT TOPICAL at 08:14

## 2022-05-04 RX ADMIN — Medication 650 MILLIGRAM(S): at 12:56

## 2022-05-04 RX ADMIN — Medication 650 MILLIGRAM(S): at 22:17

## 2022-05-04 RX ADMIN — ENOXAPARIN SODIUM 40 MILLIGRAM(S): 100 INJECTION SUBCUTANEOUS at 08:14

## 2022-05-04 RX ADMIN — CELECOXIB 200 MILLIGRAM(S): 200 CAPSULE ORAL at 17:13

## 2022-05-04 RX ADMIN — Medication 650 MILLIGRAM(S): at 21:46

## 2022-05-04 RX ADMIN — TRAMADOL HYDROCHLORIDE 50 MILLIGRAM(S): 50 TABLET ORAL at 07:45

## 2022-05-04 RX ADMIN — TRAMADOL HYDROCHLORIDE 50 MILLIGRAM(S): 50 TABLET ORAL at 06:45

## 2022-05-04 RX ADMIN — Medication 650 MILLIGRAM(S): at 17:13

## 2022-05-04 RX ADMIN — CELECOXIB 200 MILLIGRAM(S): 200 CAPSULE ORAL at 12:55

## 2022-05-04 RX ADMIN — POLYETHYLENE GLYCOL 3350 17 GRAM(S): 17 POWDER, FOR SOLUTION ORAL at 12:55

## 2022-05-04 NOTE — PROGRESS NOTE ADULT - NS ATTEND AMEND GEN_ALL_CORE FT
Rehab Attending- Patient seen and examined by me - Case discussed, above note reviewed by me with modifications made    doing well   to DC Tramadol ATC- Offer Tylenol ATC with Tramadol PRN  Mildly elevated LFTs- to follow in AM  Discussed in team conference- tentative DC 5/17  tacchycardia noted ambrocio on attempting stairs- O2 sats WNL-exertional/ pain?- push fluids  to continue intensive rehab program Rehab Attending- Patient seen and examined by me - Case discussed, above note reviewed by me with modifications made    doing well   to DC Tramadol ATC- Offer Tylenol ATC with Tramadol PRN  Mildly elevated LFTs- to follow in AM  Discussed in team conference- tentative DC 5/17  tacchycardia noted ambrocio on attempting stairs- O2 sats WNL-exertional/ pain?- push fluids  to continue intensive rehab program    20 additional minutes spent today on coordination of care including team conference as well as phone conversation with patient's wife Shaunna- I updated her on plan of care- all questions answered by me as well

## 2022-05-04 NOTE — PROGRESS NOTE ADULT - SUBJECTIVE AND OBJECTIVE BOX
Patient is a 31y old  Male who presents with a chief complaint of S/p Bilateral patellar tendon repair with NIKKI (04 May 2022 11:01)    Patient seen and examined at bedside. No acute overnight events. Denies pain/discomfort. Sleeping well. good appetite     ALLERGIES:  No Known Allergies    MEDICATIONS  (STANDING):  acetaminophen     Tablet .. 650 milliGRAM(s) Oral every 8 hours  celecoxib 200 milliGRAM(s) Oral daily  enoxaparin Injectable 40 milliGRAM(s) SubCutaneous <User Schedule>  mupirocin 2% Ointment 1 Application(s) Topical two times a day  polyethylene glycol 3350 17 Gram(s) Oral daily    MEDICATIONS  (PRN):  famotidine    Tablet 20 milliGRAM(s) Oral two times a day PRN Dyspepsia  senna 2 Tablet(s) Oral at bedtime PRN Constipation  traMADol 50 milliGRAM(s) Oral three times a day PRN Severe Pain (7 - 10)    Vital Signs Last 24 Hrs  T(F): 97.8 (04 May 2022 08:41), Max: 98.2 (03 May 2022 21:58)  HR: 99 (04 May 2022 08:41) (99 - 104)  BP: 136/84 (04 May 2022 08:41) (136/84 - 136/94)  RR: 17 (04 May 2022 08:41) (17 - 17)  SpO2: 98% (04 May 2022 08:41) (98% - 99%)  I&O's Summary    PHYSICAL EXAM:  General: NAD, A/O x 3  ENT: MMM, no tonsilar exudate  Neck: Supple, No JVD  Lungs: Clear to auscultation bilaterally, no wheezes. Good air entry bilaterally   Cardio: RRR, S1/S2, No murmurs  Abdomen: Soft, Nontender, Nondistended; Bowel sounds present  Extremities: No calf tenderness, No pitting edema    LABS:                        15.4   10.54 )-----------( 404      ( 04 May 2022 06:15 )             46.9       05-04    140  |  102  |  22  ----------------------------<  96  4.3   |  28  |  1.10    Ca    9.4      04 May 2022 06:15    TPro  8.5  /  Alb  3.3  /  TBili  0.4  /  DBili  x   /  AST  37  /  ALT  58  /  AlkPhos  92  05-04     COVID-19 PCR: NotDetec (05-01-22 @ 07:59)  COVID-19 PCR: NotDetec (04-24-22 @ 13:34)    RADIOLOGY & ADDITIONAL TESTS:     Care Discussed with Consultants/Other Providers:

## 2022-05-04 NOTE — PROGRESS NOTE ADULT - SUBJECTIVE AND OBJECTIVE BOX
CHIEF COMPLAINT: S/p Bilateral patellar tendon repair with NIKKI.       HISTORY OF PRESENT ILLNESS  32 yo male w/ PSHx of right knee ORIF to ED [facility] on 4/24 w/ bilateral knee pain. Earlier that day playing basketball, attempted to jump but fell onto his knees. He experienced severe pain and was unable to ambulate. In ED: CT shows concern for patellar ligament rupture in L knee and fractured hardware in R knee with small hematoma. Ortho was consulted. Underwent bilateral knee tendon reconstruction 4/27. No complications. WBAT [place]. Pt was evaluated by PT with recommendation for acute rehab. Cleared for dc on 5/2.     Subjective/ROS:  moved bowels today, transferred to toilet w/nursing staff  Pain controlled at present-will transition to tylenol and tramadol prn  no dizziness, no headaches  no Nausea, no Vomiting  No SOB, No Chest pain  -mild tachycardia at rest 100/no dyspnea/no chest pain/encourage PO fluids-will monitor. Lovenox dvt ppx.      MEDICATIONS  (STANDING):  acetaminophen     Tablet .. 650 milliGRAM(s) Oral every 8 hours  celecoxib 200 milliGRAM(s) Oral daily  enoxaparin Injectable 40 milliGRAM(s) SubCutaneous <User Schedule>  mupirocin 2% Ointment 1 Application(s) Topical two times a day  polyethylene glycol 3350 17 Gram(s) Oral daily    MEDICATIONS  (PRN):  famotidine    Tablet 20 milliGRAM(s) Oral two times a day PRN Dyspepsia  senna 2 Tablet(s) Oral at bedtime PRN Constipation  traMADol 50 milliGRAM(s) Oral three times a day PRN Severe Pain (7 - 10)                            15.4   10.54 )-----------( 404      ( 04 May 2022 06:15 )             46.9     05-04    140  |  102  |  22  ----------------------------<  96  4.3   |  28  |  1.10    Ca    9.4      04 May 2022 06:15    TPro  8.5<H>  /  Alb  3.3  /  TBili  0.4  /  DBili  x   /  AST  37  /  ALT  58<H>  /  AlkPhos  92  05-04      Vital Signs Last 24 Hrs  T(C): 36.6 (04 May 2022 08:41), Max: 36.8 (03 May 2022 21:58)  T(F): 97.8 (04 May 2022 08:41), Max: 98.2 (03 May 2022 21:58)  HR: 99 (04 May 2022 08:41) (99 - 104)  BP: 136/84 (04 May 2022 08:41) (136/84 - 136/94)  BP(mean): --  RR: 17 (04 May 2022 08:41) (17 - 17)  SpO2: 98% (04 May 2022 08:41) (98% - 99%)        General: NAD, Resting                                 HEENT: NC/AT, EOMI  Cardio: RRR, Normal S1-S2, No M/G/R                              Pulm: No Respiratory Distress,  Lungs CTAB                        Abdomen: ND/NT, Soft, BS+                                                                                   Ext: No edema, No calf tenderness Maynor LES with ace/aquacel/ locked bledsoes  Skin: aquacel both knees without drainage                                                             Decubitus Ulcers: None Present     Neurological Examination    Cognitive: AAO x 3                                                                         CN II - XII  intact                                                                     Sensory: Intact to light touch                                                                                           Motor    LEFT    UE: SF [5/5], EF [5/5], EE [5/5], WE [5/5],  [wnl]  RIGHT UE: SF [5/5], EF [5/5], EE [5/5], WE [5/5],  [wnl]  LEFT    LE:  HF [NT], KE [NT], DF [5/5], EHL [5/5],  PF [5/5]  RIGHT LE:  HF [NT], KE [NT], DF [5/5], EHL [5/5],  PF [5/5]      Reflex:  2 +UES, ankle maynor          Continue comprehensive acute rehab program consisting of 3hrs/day of OT/PT. CHIEF COMPLAINT: S/p Bilateral patellar tendon repair with NIKKI.       HISTORY OF PRESENT ILLNESS  30 yo male w/ PSHx of right knee ORIF to ED [facility] on 4/24 w/ bilateral knee pain. Earlier that day playing basketball, attempted to jump but fell onto his knees. He experienced severe pain and was unable to ambulate. In ED: CT shows concern for patellar ligament rupture in L knee and fractured hardware in R knee with small hematoma. Ortho was consulted. Underwent bilateral knee tendon reconstruction 4/27. No complications. WBAT [place]. Pt was evaluated by PT with recommendation for acute rehab. Cleared for dc on 5/2.     Subjective/ROS:  moved bowels today, transferred to toilet w/nursing staff  Pain controlled at present-will transition to tylenol and tramadol prn  no dizziness, no headaches  no Nausea, no Vomiting  No SOB, No Chest pain  -mild tachycardia at rest 100/no dyspnea/no chest pain/encourage PO fluids-will monitor. Lovenox dvt ppx.      MEDICATIONS  (STANDING):  acetaminophen     Tablet .. 650 milliGRAM(s) Oral every 8 hours  celecoxib 200 milliGRAM(s) Oral daily  enoxaparin Injectable 40 milliGRAM(s) SubCutaneous <User Schedule>  mupirocin 2% Ointment 1 Application(s) Topical two times a day  polyethylene glycol 3350 17 Gram(s) Oral daily    MEDICATIONS  (PRN):  famotidine    Tablet 20 milliGRAM(s) Oral two times a day PRN Dyspepsia  senna 2 Tablet(s) Oral at bedtime PRN Constipation  traMADol 50 milliGRAM(s) Oral three times a day PRN Severe Pain (7 - 10)                            15.4   10.54 )-----------( 404      ( 04 May 2022 06:15 )             46.9     05-04    140  |  102  |  22  ----------------------------<  96  4.3   |  28  |  1.10    Ca    9.4      04 May 2022 06:15    TPro  8.5<H>  /  Alb  3.3  /  TBili  0.4  /  DBili  x   /  AST  37  /  ALT  58<H>  /  AlkPhos  92  05-04      Vital Signs Last 24 Hrs  T(C): 36.6 (04 May 2022 08:41), Max: 36.8 (03 May 2022 21:58)  T(F): 97.8 (04 May 2022 08:41), Max: 98.2 (03 May 2022 21:58)  HR: 99 (04 May 2022 08:41) (99 - 104)  BP: 136/84 (04 May 2022 08:41) (136/84 - 136/94)  BP(mean): --  RR: 17 (04 May 2022 08:41) (17 - 17)  SpO2: 98% (04 May 2022 08:41) (98% - 99%)        General: NAD, Resting                                 HEENT: NC/AT, EOMI  Cardio: RRR, Normal S1-S2, No M/G/R                              Pulm: No Respiratory Distress,  Lungs CTAB                        Abdomen: ND/NT, Soft, BS+                                                                                   Ext: No edema, No calf tenderness Maynor LES with ace/aquacel/ locked bledsoes  Skin: aquacel both knees without drainage                                                             Decubitus Ulcers: None Present     Neurological Examination    Cognitive: AAO x 3                                                                         CN II - XII  intact                                                                     Sensory: Intact to light touch                                                                                           Motor    LEFT    UE: SF [5/5], EF [5/5], EE [5/5], WE [5/5],  [wnl]  RIGHT UE: SF [5/5], EF [5/5], EE [5/5], WE [5/5],  [wnl]  LEFT    LE:  HF [NT], KE [NT], DF [5/5], EHL [5/5],  PF [5/5]  RIGHT LE:  HF [NT], KE [NT], DF [5/5], EHL [5/5],  PF [5/5]      Reflex:  2 +UES, ankle maynor      IDT meeting on 5/4    SW: Apt 4STe, 15STI, lives w/wife    OT:  eating set up  grooming set up  UBD/LBD set up/min A  toileting CG/min A  tub/shower min /CG  bathing min A    PT:  amb 50ft RW CG  transfers total, transfers  stairs 4st min A    SLP na    tentative dc home w/HC on 5/17    Continue comprehensive acute rehab program consisting of 3hrs/day of OT/PT.

## 2022-05-04 NOTE — CHART NOTE - NSCHARTNOTEFT_GEN_A_CORE
[facility] Plan of Care    REHABILITATION DIAGNOSIS:  odalys quads tendon rupture          COMORBIDITIES/COMPLICATING CONDITIONS IMPACTING REHABILITATION:  HEALTH ISSUES - PROBLEM Dx:  post op pain      PAST MEDICAL & SURGICAL HISTORY:  History of open reduction and internal fixation (ORIF) procedure  Right knee        Based upon consideration of the patient's impairments, functional status, complicating conditions and any other contributing factors and after information garnered from the assessments of all therapy disciplines involved in treating the patient and other pertinent clinicians:    INTERDISCIPLINARY REHABILITATION INTERVENTIONS:    [ X  ] Transfer Training  [ X  ] Bed Mobility  [ X  ] Therapeutic Exercise  [ X ] Balance/Coordination Exercises  [ X ] Locomotion retraining  [ X  ] Stairs  [  X ] Functional Transfer Training  [  x ] Bowel/Bladder program  [  x ] Pain Management  [  x ] Skin/Wound Care  [   ] Visual/Perceptual Training  [   ] Therapeutic Recreation Activities  [   ] Neuromuscular Re-education  [ X  ] Activities of Daily Living  [   ] Speech Exercise  [   ] Swallowing Exercises  [   ] Vital Stim  [   ] Dietary Supplements  [   ] Calorie Count  [   ] Cognitive Exercises  [   ] Congnitive/Linguistic Treatment  [   ] Behavior Program  [   ] Neuropsych Therapy  [ X  ] Patient/Family Counseling  [ X ] Family Training  [ X  ] Community Re-entry  [   ] Orthotic Evaluation  [   ] Prosthetic Eval/Training    MEDICAL PROGNOSIS:  good    REHAB POTENTIAL:  good  EXPECTED DAILY THERAPY:         PT:2hr       OT:1hr       ST:       P&O:    EXPECTED INTENSITY OF PROGRAM:  3 hrs / Day    EXPECTED FREQUENCY OF PROGRAM: 5 Days/ Week    ESTIMATED LOS:  [  ] 5-7 Days  [  ] 7-10 Days    ] 10- 14 Days  [  x] 14- 18 Days  [  ] 18- 21 Days    ESTIMATED DISPOSITION:  [  ] Home   [  ] Home with Outpatient Therapies  [ x ] Home with Home Therapies  [  ] Assisted Living  [  ] Nursing Home  [  ] Long Term Acute Care    INTERDISCIPLINARY FUNCTIONAL OUTCOMES/GOALS:         Gait/Mobility:6       Transfers:6       ADLs:4       Functional Transfers:6       Medication Management7:       Communication:NA       Cognitive:NA       Dysphagia:NA       Bladder7       Bowel:7     Functional Independent Measures:   7 = Independent  6 = Modified Independent  5 = Supervision  4 = Minimal Assist/ Contact Guard  3 = Moderate Assistance  2 = Maximum Assistance  1 = Total Assistance  0 = Unable to assess

## 2022-05-05 LAB — TSH SERPL-MCNC: 1.64 UIU/ML — SIGNIFICANT CHANGE UP (ref 0.36–3.74)

## 2022-05-05 PROCEDURE — 99232 SBSQ HOSP IP/OBS MODERATE 35: CPT

## 2022-05-05 PROCEDURE — 99232 SBSQ HOSP IP/OBS MODERATE 35: CPT | Mod: GC

## 2022-05-05 RX ADMIN — MUPIROCIN 1 APPLICATION(S): 20 OINTMENT TOPICAL at 17:26

## 2022-05-05 RX ADMIN — ENOXAPARIN SODIUM 40 MILLIGRAM(S): 100 INJECTION SUBCUTANEOUS at 08:04

## 2022-05-05 RX ADMIN — Medication 650 MILLIGRAM(S): at 06:49

## 2022-05-05 RX ADMIN — Medication 650 MILLIGRAM(S): at 13:04

## 2022-05-05 RX ADMIN — CELECOXIB 200 MILLIGRAM(S): 200 CAPSULE ORAL at 13:04

## 2022-05-05 RX ADMIN — Medication 650 MILLIGRAM(S): at 21:24

## 2022-05-05 RX ADMIN — Medication 650 MILLIGRAM(S): at 05:23

## 2022-05-05 RX ADMIN — MUPIROCIN 1 APPLICATION(S): 20 OINTMENT TOPICAL at 05:24

## 2022-05-05 RX ADMIN — Medication 650 MILLIGRAM(S): at 17:26

## 2022-05-05 RX ADMIN — CELECOXIB 200 MILLIGRAM(S): 200 CAPSULE ORAL at 17:26

## 2022-05-05 RX ADMIN — Medication 650 MILLIGRAM(S): at 22:47

## 2022-05-05 NOTE — PROGRESS NOTE ADULT - SUBJECTIVE AND OBJECTIVE BOX
Patient is a 31y old  Male who presents with a chief complaint of S/p Bilateral patellar tendon repair with NIKKI (05 May 2022 11:22)      Patient seen and examined at bedside. No overnight events.  Pain well controlled with current regimen.     REVIEW OF SYSTEMS:  CONSTITUTIONAL: No fever or chills  HEENT:  No headache, no sore throat  RESPIRATORY: No cough, wheezing, or shortness of breath  CARDIOVASCULAR: No chest pain, palpitations    ALLERGIES:  No Known Allergies    MEDICATIONS  (STANDING):  acetaminophen     Tablet .. 650 milliGRAM(s) Oral every 8 hours  celecoxib 200 milliGRAM(s) Oral daily  enoxaparin Injectable 40 milliGRAM(s) SubCutaneous <User Schedule>  mupirocin 2% Ointment 1 Application(s) Topical two times a day  polyethylene glycol 3350 17 Gram(s) Oral daily    MEDICATIONS  (PRN):  famotidine    Tablet 20 milliGRAM(s) Oral two times a day PRN Dyspepsia  senna 2 Tablet(s) Oral at bedtime PRN Constipation  traMADol 50 milliGRAM(s) Oral three times a day PRN Severe Pain (7 - 10)    Vital Signs Last 24 Hrs  T(F): 97.8 (05 May 2022 08:57), Max: 98.1 (04 May 2022 20:19)  HR: 100 (05 May 2022 08:57) (100 - 102)  BP: 137/90 (05 May 2022 08:57) (136/93 - 137/90)  RR: 17 (05 May 2022 08:57) (17 - 17)  SpO2: 99% (05 May 2022 08:57) (97% - 99%)  I&O's Summary        PHYSICAL EXAM:  GENERAL: NAD  HEENT:  AT/NC, anicteric, moist mucous membranes, EOMI, PERRL, no lid-lag, conjunctiva and sclera clear  CHEST/LUNG:  CTA b/l, no rales, wheezes, or rhonchi,  normal respiratory effort, no intercostal retractions  HEART:  RRR, S1, S2, no murmurs; no pitting edema  ABDOMEN:  BS+, soft, nontender, nondistended; No HSM  MSK/EXTREMITIES: 2+ peripheral pulses, no clubbing or cyanosis; bilateral knee braces in place  NERVOUS SYSTEM: answers questions and follows commands appropriately  PSYCH: Appropriate affect, Alert & Awake; Good judgement    LABS: Personally reviewed                        15.4   10.54 )-----------( 404      ( 04 May 2022 06:15 )             46.9       05-04    140  |  102  |  22  ----------------------------<  96  4.3   |  28  |  1.10    Ca    9.4      04 May 2022 06:15    TPro  8.5  /  Alb  3.3  /  TBili  0.4  /  DBili  x   /  AST  37  /  ALT  58  /  AlkPhos  92  05-04                  TSH 1.642   TSH with FT4 reflex --  Total T3 --                COVID-19 PCR: NotDetec (05-01-22 @ 07:59)  COVID-19 PCR: NotDetec (04-24-22 @ 13:34)      RADIOLOGY & ADDITIONAL TESTS: Personally reviewed    Medical management discussed with Dr. Moraes (physiatry), continue to monitor tachycardia, hydration encouraged

## 2022-05-05 NOTE — PROGRESS NOTE ADULT - SUBJECTIVE AND OBJECTIVE BOX
CHIEF COMPLAINT: S/p Bilateral patellar tendon repair with NIKKI.       HISTORY OF PRESENT ILLNESS  32 yo male w/ PSHx of right knee ORIF to ED [facility] on 4/24 w/ bilateral knee pain. Earlier that day playing basketball, attempted to jump but fell onto his knees. He experienced severe pain and was unable to ambulate. In ED: CT shows concern for patellar ligament rupture in L knee and fractured hardware in R knee with small hematoma. Ortho was consulted. Underwent bilateral knee tendon reconstruction 4/27. No complications. WBAT [place]. Pt was evaluated by PT with recommendation for acute rehab. Cleared for dc on 5/2.     Subjective/ROS:  moving bowels daily, voiding  Pain controlled at present- tylenol ,tramadol now PRN  no dizziness, no headaches  no Nausea, no Vomiting  No SOB, No Chest pain  -mild tachycardia at rest 100 persists      MEDICATIONS  (STANDING):  acetaminophen     Tablet .. 650 milliGRAM(s) Oral every 8 hours  celecoxib 200 milliGRAM(s) Oral daily  enoxaparin Injectable 40 milliGRAM(s) SubCutaneous <User Schedule>  mupirocin 2% Ointment 1 Application(s) Topical two times a day  polyethylene glycol 3350 17 Gram(s) Oral daily    MEDICATIONS  (PRN):  famotidine    Tablet 20 milliGRAM(s) Oral two times a day PRN Dyspepsia  senna 2 Tablet(s) Oral at bedtime PRN Constipation  traMADol 50 milliGRAM(s) Oral three times a day PRN Severe Pain (7 - 10)                            15.4   10.54 )-----------( 404      ( 04 May 2022 06:15 )             46.9     05-04    140  |  102  |  22  ----------------------------<  96  4.3   |  28  |  1.10    Ca    9.4      04 May 2022 06:15    TPro  8.5<H>  /  Alb  3.3  /  TBili  0.4  /  DBili  x   /  AST  37  /  ALT  58<H>  /  AlkPhos  92  05-04        CAPILLARY BLOOD GLUCOSE          Vital Signs Last 24 Hrs  T(C): 36.6 (05 May 2022 08:57), Max: 36.7 (04 May 2022 20:19)  T(F): 97.8 (05 May 2022 08:57), Max: 98.1 (04 May 2022 20:19)  HR: 100 (05 May 2022 08:57) (100 - 102)  BP: 137/90 (05 May 2022 08:57) (136/93 - 137/90)  BP(mean): --  RR: 17 (05 May 2022 08:57) (17 - 17)  SpO2: 99% (05 May 2022 08:57) (97% - 99%)      General: NAD, Resting                                 HEENT: NC/AT, EOMI  Cardio: RRR, Normal S1-S2, No M/G/R                              Pulm: No Respiratory Distress,  Lungs CTAB                        Abdomen: ND/NT, Soft, BS+                                                                                   Ext: No edema, No calf tenderness Maynor LES with ace/aquacel/ locked bledsoes  Skin: aquacel both knees without drainage                                                             Decubitus Ulcers: None Present     Neurological Examination    Cognitive: AAO x 3                                                                         CN II - XII  intact                                                                     Sensory: Intact to light touch                                                                                           Motor    LEFT    UE: SF [5/5], EF [5/5], EE [5/5], WE [5/5],  [wnl]  RIGHT UE: SF [5/5], EF [5/5], EE [5/5], WE [5/5],  [wnl]  LEFT    LE:  HF [NT], KE [NT], DF [5/5], EHL [5/5],  PF [5/5]  RIGHT LE:  HF [NT], KE [NT], DF [5/5], EHL [5/5],  PF [5/5]      Reflex:  2 +UES, ankle maynor      IDT meeting on 5/4    SW: Apt 4STE, 15STI, lives w/wife    OT:  eating set up  grooming set up  UBD/LBD set up/min A  toileting CG/min A  tub/shower min /CG  bathing min A    PT:  amb 50ft RW CG  transfers total, transfers  stairs 4st min A    SLP na    tentative dc home w/HC on 5/17    Continue comprehensive acute rehab program consisting of 3hrs/day of OT/PT.

## 2022-05-06 LAB
ALBUMIN SERPL ELPH-MCNC: 3.4 G/DL — SIGNIFICANT CHANGE UP (ref 3.3–5)
ALP SERPL-CCNC: 89 U/L — SIGNIFICANT CHANGE UP (ref 40–120)
ALT FLD-CCNC: 47 U/L — HIGH (ref 10–45)
ANION GAP SERPL CALC-SCNC: 8 MMOL/L — SIGNIFICANT CHANGE UP (ref 5–17)
AST SERPL-CCNC: 27 U/L — SIGNIFICANT CHANGE UP (ref 10–40)
BILIRUB SERPL-MCNC: 0.5 MG/DL — SIGNIFICANT CHANGE UP (ref 0.2–1.2)
BUN SERPL-MCNC: 21 MG/DL — SIGNIFICANT CHANGE UP (ref 7–23)
CALCIUM SERPL-MCNC: 9.7 MG/DL — SIGNIFICANT CHANGE UP (ref 8.4–10.5)
CHLORIDE SERPL-SCNC: 104 MMOL/L — SIGNIFICANT CHANGE UP (ref 96–108)
CO2 SERPL-SCNC: 28 MMOL/L — SIGNIFICANT CHANGE UP (ref 22–31)
CREAT SERPL-MCNC: 1.2 MG/DL — SIGNIFICANT CHANGE UP (ref 0.5–1.3)
EGFR: 83 ML/MIN/1.73M2 — SIGNIFICANT CHANGE UP
GLUCOSE SERPL-MCNC: 103 MG/DL — HIGH (ref 70–99)
HCT VFR BLD CALC: 45.6 % — SIGNIFICANT CHANGE UP (ref 39–50)
HGB BLD-MCNC: 15.1 G/DL — SIGNIFICANT CHANGE UP (ref 13–17)
MCHC RBC-ENTMCNC: 27.3 PG — SIGNIFICANT CHANGE UP (ref 27–34)
MCHC RBC-ENTMCNC: 33.1 GM/DL — SIGNIFICANT CHANGE UP (ref 32–36)
MCV RBC AUTO: 82.5 FL — SIGNIFICANT CHANGE UP (ref 80–100)
NRBC # BLD: 0 /100 WBCS — SIGNIFICANT CHANGE UP (ref 0–0)
PLATELET # BLD AUTO: 428 K/UL — HIGH (ref 150–400)
POTASSIUM SERPL-MCNC: 4.9 MMOL/L — SIGNIFICANT CHANGE UP (ref 3.5–5.3)
POTASSIUM SERPL-SCNC: 4.9 MMOL/L — SIGNIFICANT CHANGE UP (ref 3.5–5.3)
PROT SERPL-MCNC: 8.5 G/DL — HIGH (ref 6–8.3)
RBC # BLD: 5.53 M/UL — SIGNIFICANT CHANGE UP (ref 4.2–5.8)
RBC # FLD: 12.9 % — SIGNIFICANT CHANGE UP (ref 10.3–14.5)
SODIUM SERPL-SCNC: 140 MMOL/L — SIGNIFICANT CHANGE UP (ref 135–145)
WBC # BLD: 9.58 K/UL — SIGNIFICANT CHANGE UP (ref 3.8–10.5)
WBC # FLD AUTO: 9.58 K/UL — SIGNIFICANT CHANGE UP (ref 3.8–10.5)

## 2022-05-06 PROCEDURE — 99232 SBSQ HOSP IP/OBS MODERATE 35: CPT | Mod: GC

## 2022-05-06 PROCEDURE — 99232 SBSQ HOSP IP/OBS MODERATE 35: CPT

## 2022-05-06 RX ORDER — BENZOCAINE AND MENTHOL 5; 1 G/100ML; G/100ML
1 LIQUID ORAL
Refills: 0 | Status: DISCONTINUED | OUTPATIENT
Start: 2022-05-06 | End: 2022-05-11

## 2022-05-06 RX ORDER — MUPIROCIN 20 MG/G
1 OINTMENT TOPICAL
Refills: 0 | Status: DISCONTINUED | OUTPATIENT
Start: 2022-05-06 | End: 2022-05-11

## 2022-05-06 RX ADMIN — Medication 650 MILLIGRAM(S): at 05:26

## 2022-05-06 RX ADMIN — ENOXAPARIN SODIUM 40 MILLIGRAM(S): 100 INJECTION SUBCUTANEOUS at 07:48

## 2022-05-06 RX ADMIN — Medication 650 MILLIGRAM(S): at 15:33

## 2022-05-06 RX ADMIN — Medication 650 MILLIGRAM(S): at 06:37

## 2022-05-06 RX ADMIN — BENZOCAINE AND MENTHOL 1 LOZENGE: 5; 1 LIQUID ORAL at 20:45

## 2022-05-06 RX ADMIN — Medication 650 MILLIGRAM(S): at 22:25

## 2022-05-06 RX ADMIN — MUPIROCIN 1 APPLICATION(S): 20 OINTMENT TOPICAL at 17:48

## 2022-05-06 RX ADMIN — CELECOXIB 200 MILLIGRAM(S): 200 CAPSULE ORAL at 11:47

## 2022-05-06 RX ADMIN — Medication 650 MILLIGRAM(S): at 21:05

## 2022-05-06 RX ADMIN — BENZOCAINE AND MENTHOL 1 LOZENGE: 5; 1 LIQUID ORAL at 15:35

## 2022-05-06 NOTE — PROGRESS NOTE ADULT - NS ATTEND AMEND GEN_ALL_CORE FT
Rehab Attending- Patient seen and examined by me - Case discussed, above note reviewed by me with modifications made    doing well  voiding, last BM 5/5  major issue - car transfers  better on stairs  aquacel Dcd both knees- subcut stitch- incisions intact  remains mildly tacchycardic at rest  O2 sats good, no calf tenderness- push PO fluids  to continue intensive rehab program

## 2022-05-06 NOTE — PROGRESS NOTE ADULT - SUBJECTIVE AND OBJECTIVE BOX
CHIEF COMPLAINT: S/p Bilateral patellar tendon repair with NIKKI      HISTORY OF PRESENT ILLNESS  30 yo male w/ PSHx of right knee ORIF to ED [facility] on 4/24 w/ bilateral knee pain. Earlier that day playing basketball, attempted to jump but fell onto his knees. He experienced severe pain and was unable to ambulate. In ED: CT shows concern for patellar ligament rupture in L knee and fractured hardware in R knee with small hematoma. Ortho was consulted. Underwent bilateral knee tendon reconstruction 4/27. No complications. WBAT [place]. Pt was evaluated by PT with recommendation for acute rehab. Cleared for dc on 5/2.      Subjective/ROS:  moving bowels daily, voiding  Pain controlled at present- tylenol ,tramadol now PRN  no dizziness, no headaches  no Nausea, no Vomiting  No SOB, No Chest pain  -mild tachycardia at rest 100 persists      MEDICATIONS  (STANDING):  acetaminophen     Tablet .. 650 milliGRAM(s) Oral every 8 hours  celecoxib 200 milliGRAM(s) Oral daily  enoxaparin Injectable 40 milliGRAM(s) SubCutaneous <User Schedule>  mupirocin 2% Ointment 1 Application(s) Topical two times a day  polyethylene glycol 3350 17 Gram(s) Oral daily    MEDICATIONS  (PRN):  famotidine    Tablet 20 milliGRAM(s) Oral two times a day PRN Dyspepsia  senna 2 Tablet(s) Oral at bedtime PRN Constipation  traMADol 50 milliGRAM(s) Oral three times a day PRN Severe Pain (7 - 10)                            15.1   9.58  )-----------( 428      ( 06 May 2022 05:45 )             45.6     05-06    140  |  104  |  21  ----------------------------<  103<H>  4.9   |  28  |  1.20    Ca    9.7      06 May 2022 05:45    TPro  8.5<H>  /  Alb  3.4  /  TBili  0.5  /  DBili  x   /  AST  27  /  ALT  47<H>  /  AlkPhos  89  05-06      Vital Signs Last 24 Hrs  T(C): 36.6 (06 May 2022 07:47), Max: 37.6 (05 May 2022 20:24)  T(F): 97.9 (06 May 2022 07:47), Max: 99.6 (05 May 2022 20:24)  HR: 92 (06 May 2022 07:47) (92 - 103)  BP: 131/91 (06 May 2022 07:47) (131/91 - 144/92)  BP(mean): --  RR: 16 (06 May 2022 07:47) (16 - 16)  SpO2: 95% (06 May 2022 07:47) (95% - 100%)          General: NAD, working in therapy                                HEENT: NC/AT, EOMI  Cardio: RRR, Normal S1-S2, No M/G/R                              Pulm: No Respiratory Distress,  Lungs CTAB                        Abdomen: ND/NT, Soft, BS+                                                                                   Ext: No edema, No calf tenderness Maynor LES with ace/aquacel/ locked bledsoes  Skin: aquacel both knees without drainage                                                             Decubitus Ulcers: None Present     Neurological Examination    Cognitive: AAO x 3                                                                         CN II - XII  intact                                                                     Sensory: Intact to light touch                                                                                           Motor    LEFT    UE: SF [5/5], EF [5/5], EE [5/5], WE [5/5],  [wnl]  RIGHT UE: SF [5/5], EF [5/5], EE [5/5], WE [5/5],  [wnl]  LEFT    LE:  HF [NT], KE [NT], DF [5/5], EHL [5/5],  PF [5/5]  RIGHT LE:  HF [NT], KE [NT], DF [5/5], EHL [5/5],  PF [5/5]      Reflex:  2 +UES, ankle maynor      IDT meeting on 5/4    SW: Apt 4STE, 15STI, lives w/wife    OT:  eating set up  grooming set up  UBD/LBD set up/min A  toileting CG/min A  tub/shower min /CG  bathing min A    PT:  amb 50ft RW CG  transfers total, transfers  stairs 4st min A    SLP na    tentative dc home w/HC on 5/17    Continue comprehensive acute rehab program consisting of 3hrs/day of OT/PT.

## 2022-05-06 NOTE — PROGRESS NOTE ADULT - SUBJECTIVE AND OBJECTIVE BOX
Patient is a 31y old  Male who presents with a chief complaint of S/p Bilateral patellar tendon repair with NIKKI (06 May 2022 10:40)      Patient seen and examined at bedside. No overnight events.  Intermittent tachycardia at rest. Pain overall well controlled with current regimen.     REVIEW OF SYSTEMS:  CONSTITUTIONAL: No fever or chills  HEENT:  No headache, no sore throat  RESPIRATORY: No cough, wheezing, or shortness of breath  CARDIOVASCULAR: No chest pain, palpitations      ALLERGIES:  No Known Allergies    MEDICATIONS  (STANDING):  acetaminophen     Tablet .. 650 milliGRAM(s) Oral every 8 hours  benzocaine 15 mG/menthol 3.6 mG Lozenge 1 Lozenge Oral five times a day  celecoxib 200 milliGRAM(s) Oral daily  enoxaparin Injectable 40 milliGRAM(s) SubCutaneous <User Schedule>  mupirocin 2% Ointment 1 Application(s) Topical two times a day  polyethylene glycol 3350 17 Gram(s) Oral daily    MEDICATIONS  (PRN):  famotidine    Tablet 20 milliGRAM(s) Oral two times a day PRN Dyspepsia  senna 2 Tablet(s) Oral at bedtime PRN Constipation  traMADol 50 milliGRAM(s) Oral three times a day PRN Severe Pain (7 - 10)    Vital Signs Last 24 Hrs  T(F): 97.9 (06 May 2022 07:47), Max: 99.6 (05 May 2022 20:24)  HR: 92 (06 May 2022 07:47) (92 - 103)  BP: 131/91 (06 May 2022 07:47) (131/91 - 144/92)  RR: 16 (06 May 2022 07:47) (16 - 16)  SpO2: 95% (06 May 2022 07:47) (95% - 100%)  I&O's Summary      PHYSICAL EXAM:  GENERAL: NAD  HEENT:  AT/NC, anicteric, moist mucous membranes, EOMI, PERRL, no lid-lag, conjunctiva and sclera clear  CHEST/LUNG:  CTA b/l, no rales, wheezes, or rhonchi,  normal respiratory effort, no intercostal retractions  HEART:  RRR, S1, S2, no murmurs; no pitting edema  ABDOMEN:  BS+, soft, nontender, nondistended; No HSM  MSK/EXTREMITIES: 2+ peripheral pulses, no clubbing or cyanosis; bilateral knee braces in place  NERVOUS SYSTEM: answers questions and follows commands appropriately  PSYCH: Appropriate affect, Alert & Awake; Good judgement    LABS: Personally reviewed                        15.1   9.58  )-----------( 428      ( 06 May 2022 05:45 )             45.6       05-06    140  |  104  |  21  ----------------------------<  103  4.9   |  28  |  1.20    Ca    9.7      06 May 2022 05:45    TPro  8.5  /  Alb  3.4  /  TBili  0.5  /  DBili  x   /  AST  27  /  ALT  47  /  AlkPhos  89  05-06                  TSH 1.642   TSH with FT4 reflex --  Total T3 --                      COVID-19 PCR: NotDetec (05-01-22 @ 07:59)  COVID-19 PCR: NotDetec (04-24-22 @ 13:34)      RADIOLOGY & ADDITIONAL TESTS: Personally reviewed    Medical management discussed with Dr. Moraes (physiatry), continue to monitor HR. Pain regimen well controlled. Add Cepacol per patient preference.

## 2022-05-07 PROCEDURE — 99232 SBSQ HOSP IP/OBS MODERATE 35: CPT

## 2022-05-07 RX ORDER — SIMETHICONE 80 MG/1
80 TABLET, CHEWABLE ORAL THREE TIMES A DAY
Refills: 0 | Status: DISCONTINUED | OUTPATIENT
Start: 2022-05-07 | End: 2022-05-11

## 2022-05-07 RX ADMIN — BENZOCAINE AND MENTHOL 1 LOZENGE: 5; 1 LIQUID ORAL at 20:50

## 2022-05-07 RX ADMIN — ENOXAPARIN SODIUM 40 MILLIGRAM(S): 100 INJECTION SUBCUTANEOUS at 08:19

## 2022-05-07 RX ADMIN — Medication 650 MILLIGRAM(S): at 05:45

## 2022-05-07 RX ADMIN — Medication 650 MILLIGRAM(S): at 05:15

## 2022-05-07 RX ADMIN — Medication 650 MILLIGRAM(S): at 22:39

## 2022-05-07 RX ADMIN — SIMETHICONE 80 MILLIGRAM(S): 80 TABLET, CHEWABLE ORAL at 11:23

## 2022-05-07 RX ADMIN — BENZOCAINE AND MENTHOL 1 LOZENGE: 5; 1 LIQUID ORAL at 08:21

## 2022-05-07 RX ADMIN — BENZOCAINE AND MENTHOL 1 LOZENGE: 5; 1 LIQUID ORAL at 11:35

## 2022-05-07 RX ADMIN — CELECOXIB 200 MILLIGRAM(S): 200 CAPSULE ORAL at 12:30

## 2022-05-07 RX ADMIN — CELECOXIB 200 MILLIGRAM(S): 200 CAPSULE ORAL at 11:35

## 2022-05-07 RX ADMIN — Medication 650 MILLIGRAM(S): at 21:21

## 2022-05-07 RX ADMIN — Medication 650 MILLIGRAM(S): at 13:12

## 2022-05-07 RX ADMIN — MUPIROCIN 1 APPLICATION(S): 20 OINTMENT TOPICAL at 08:21

## 2022-05-07 RX ADMIN — Medication 650 MILLIGRAM(S): at 14:10

## 2022-05-07 RX ADMIN — BENZOCAINE AND MENTHOL 1 LOZENGE: 5; 1 LIQUID ORAL at 01:31

## 2022-05-07 NOTE — PROGRESS NOTE ADULT - SUBJECTIVE AND OBJECTIVE BOX
HPI:  30 yo male w/ PSHx of right knee ORIF to ED [facility] on 4/24 w/ bilateral knee pain. Earlier that day playing basketball, attempted to jump but fell onto his knees. He experienced severe pain and was unable to ambulate. In ED: CT shows concern for patellar ligament rupture in L knee and fractured hardware in R knee with small hematoma. Ortho was consulted. Underwent bilateral knee tendon reconstruction 4/27. No complications. WBAT [place]. Pt was evaluated by PT with recommendation for acute rehab. Cleared for dc on 5/2.    (02 May 2022 13:12)      Subjective    no new complaints      PAST MEDICAL & SURGICAL HISTORY:  History of open reduction and internal fixation (ORIF) procedure  Right knee        MedsMEDICATIONS  (STANDING):  acetaminophen     Tablet .. 650 milliGRAM(s) Oral every 8 hours  benzocaine 15 mG/menthol 3.6 mG Lozenge 1 Lozenge Oral five times a day  celecoxib 200 milliGRAM(s) Oral daily  enoxaparin Injectable 40 milliGRAM(s) SubCutaneous <User Schedule>  mupirocin 2% Ointment 1 Application(s) Topical <User Schedule>  polyethylene glycol 3350 17 Gram(s) Oral daily    MEDICATIONS  (PRN):  famotidine    Tablet 20 milliGRAM(s) Oral two times a day PRN Dyspepsia  senna 2 Tablet(s) Oral at bedtime PRN Constipation  simethicone 80 milliGRAM(s) Chew three times a day PRN Dyspepsia  traMADol 50 milliGRAM(s) Oral three times a day PRN Severe Pain (7 - 10)      Vital Signs Last 24 Hrs  T(C): 36.2 (07 May 2022 08:18), Max: 36.9 (06 May 2022 20:09)  T(F): 97.1 (07 May 2022 08:18), Max: 98.5 (06 May 2022 20:09)  HR: 97 (07 May 2022 08:18) (97 - 99)  BP: 128/86 (07 May 2022 08:18) (128/79 - 128/86)  BP(mean): --  RR: 16 (07 May 2022 08:18) (16 - 16)  SpO2: 97% (07 May 2022 08:18) (97% - 99%)  I&O's Summary      PHYSICAL EXAM:  GENERAL: NAD  NECK: Supple  NERVOUS SYSTEM:  awake and alert  HEART: S1s2 NL , RRR  CHEST/LUNG: Clear to percussion bilaterally  ABDOMEN: Soft, Nontender, Nondistended; Bowel sounds present  EXTREMITIES:  No edema      LABS:(05-06 @ 05:45)                      15.1  9.58 )-----------( 428                 45.6    Neutrophils = -- (--%)  Lymphocytes = -- (--%)  Eosinophils = -- (--%)  Basophils = -- (--%)  Monocytes = -- (--%)  Bands = --%    05-06    140  |  104  |  21  ----------------------------<  103<H>  4.9   |  28  |  1.20    Ca    9.7      06 May 2022 05:45    TPro  8.5<H>  /  Alb  3.4  /  TBili  0.5  /  DBili  x   /  AST  27  /  ALT  47<H>  /  AlkPhos  89  05-06          RVP:          Tox:           CAPILLARY BLOOD GLUCOSE          Imaging Personally Reviewed:  [ ] YES  [ ] NO        Care Discussed with Consultants/Other Providers [ x] YES  [ ] NO

## 2022-05-07 NOTE — PROGRESS NOTE ADULT - SUBJECTIVE AND OBJECTIVE BOX
Pt. seen and examined at bedside.  No overnight events.  Walks ad jessica WBAT w/ b/l [place] brace.      REVIEW OF SYSTEMS  Constitutional - No fever,  No fatigue  Neurological - No headaches, No loss of strength  Musculoskeletal - ++ joint pain, No joint swelling, No muscle pain    VITALS  T(C): 36.2 (05-07-22 @ 08:18), Max: 36.9 (05-06-22 @ 20:09)  HR: 97 (05-07-22 @ 08:18) (97 - 99)  BP: 128/86 (05-07-22 @ 08:18) (128/79 - 128/86)  RR: 16 (05-07-22 @ 08:18) (16 - 16)  SpO2: 97% (05-07-22 @ 08:18) (97% - 99%)  Wt(kg): --       MEDICATIONS   acetaminophen     Tablet .. 650 milliGRAM(s) every 8 hours  benzocaine 15 mG/menthol 3.6 mG Lozenge 1 Lozenge five times a day  celecoxib 200 milliGRAM(s) daily  enoxaparin Injectable 40 milliGRAM(s) <User Schedule>  famotidine    Tablet 20 milliGRAM(s) two times a day PRN  mupirocin 2% Ointment 1 Application(s) <User Schedule>  polyethylene glycol 3350 17 Gram(s) daily  senna 2 Tablet(s) at bedtime PRN  simethicone 80 milliGRAM(s) three times a day PRN  traMADol 50 milliGRAM(s) three times a day PRN      RECENT LABS/IMAGING                        15.1   9.58  )-----------( 428      ( 06 May 2022 05:45 )             45.6     05-06    140  |  104  |  21  ----------------------------<  103<H>  4.9   |  28  |  1.20    Ca    9.7      06 May 2022 05:45    TPro  8.5<H>  /  Alb  3.4  /  TBili  0.5  /  DBili  x   /  AST  27  /  ALT  47<H>  /  AlkPhos  89  05-06                  ---------  PHYSICAL EXAM  Constitutional - NAD, Comfortable  Pulm - Breathing comfortably, No wheezing  Abd - MILD OBESITY; Soft, NTND  Extremities - No edema, No calf tenderness  B/L AJIT BRACES APPLIED  Neurologic Exam -                    Cognitive - Awake, Alert     Communication - Fluent     Motor - No focal deficits     Sensory - Intact to LT  Psychiatric - Mood WNL, Affect WNL    ASSESSMENT/PLAN  31y Male with functional deficits s/p B/L PATELLA TENDON REPAIR AND NIKKI.  Continue current medical management  Pain - Tylenol PRN; CELECOXIB; TRAMADOL PRN  DVT PPX - enoxaparin Injectable 40 milliGRAM(s)  Active issues - NONE; LABS NOTED  Continue 3hrs a day of comprehensive rehab program.

## 2022-05-08 PROCEDURE — 99232 SBSQ HOSP IP/OBS MODERATE 35: CPT

## 2022-05-08 RX ORDER — PSYLLIUM SEED (WITH DEXTROSE)
1 POWDER (GRAM) ORAL DAILY
Refills: 0 | Status: DISCONTINUED | OUTPATIENT
Start: 2022-05-08 | End: 2022-05-11

## 2022-05-08 RX ADMIN — BENZOCAINE AND MENTHOL 1 LOZENGE: 5; 1 LIQUID ORAL at 21:50

## 2022-05-08 RX ADMIN — Medication 1 PACKET(S): at 18:34

## 2022-05-08 RX ADMIN — ENOXAPARIN SODIUM 40 MILLIGRAM(S): 100 INJECTION SUBCUTANEOUS at 07:25

## 2022-05-08 RX ADMIN — BENZOCAINE AND MENTHOL 1 LOZENGE: 5; 1 LIQUID ORAL at 23:30

## 2022-05-08 RX ADMIN — Medication 650 MILLIGRAM(S): at 05:34

## 2022-05-08 RX ADMIN — CELECOXIB 200 MILLIGRAM(S): 200 CAPSULE ORAL at 12:40

## 2022-05-08 RX ADMIN — Medication 650 MILLIGRAM(S): at 15:50

## 2022-05-08 RX ADMIN — BENZOCAINE AND MENTHOL 1 LOZENGE: 5; 1 LIQUID ORAL at 17:09

## 2022-05-08 RX ADMIN — SIMETHICONE 80 MILLIGRAM(S): 80 TABLET, CHEWABLE ORAL at 18:36

## 2022-05-08 RX ADMIN — BENZOCAINE AND MENTHOL 1 LOZENGE: 5; 1 LIQUID ORAL at 07:26

## 2022-05-08 RX ADMIN — BENZOCAINE AND MENTHOL 1 LOZENGE: 5; 1 LIQUID ORAL at 01:03

## 2022-05-08 RX ADMIN — Medication 650 MILLIGRAM(S): at 06:31

## 2022-05-08 RX ADMIN — CELECOXIB 200 MILLIGRAM(S): 200 CAPSULE ORAL at 11:46

## 2022-05-08 RX ADMIN — BENZOCAINE AND MENTHOL 1 LOZENGE: 5; 1 LIQUID ORAL at 11:46

## 2022-05-08 RX ADMIN — Medication 650 MILLIGRAM(S): at 14:57

## 2022-05-08 RX ADMIN — MUPIROCIN 1 APPLICATION(S): 20 OINTMENT TOPICAL at 07:25

## 2022-05-08 NOTE — PROGRESS NOTE ADULT - TIME-BASED BILLING (NON-CRITICAL CARE)
Time-based billing (NON-critical care)
Time-based billing (NON-critical care)
DISPLAY PLAN FREE TEXT

## 2022-05-08 NOTE — PROGRESS NOTE ADULT - SUBJECTIVE AND OBJECTIVE BOX
Pt. seen and examined at bedside.  No overnight events.      REVIEW OF SYSTEMS  Constitutional - No fever,  No fatigue  Neurological - No headaches, No loss of strength  Musculoskeletal - No joint pain, No joint swelling, ++ muscle pain    VITALS  T(C): 36.8 (05-08-22 @ 07:23), Max: 36.8 (05-07-22 @ 20:07)  HR: 89 (05-08-22 @ 07:23) (89 - 98)  BP: 123/82 (05-08-22 @ 07:23) (123/82 - 144/81)  RR: 16 (05-08-22 @ 07:23) (16 - 16)  SpO2: 99% (05-08-22 @ 07:23) (97% - 99%)  Wt(kg): --       MEDICATIONS   acetaminophen     Tablet .. 650 milliGRAM(s) every 8 hours  benzocaine 15 mG/menthol 3.6 mG Lozenge 1 Lozenge five times a day  celecoxib 200 milliGRAM(s) daily  enoxaparin Injectable 40 milliGRAM(s) <User Schedule>  famotidine    Tablet 20 milliGRAM(s) two times a day PRN  mupirocin 2% Ointment 1 Application(s) <User Schedule>  polyethylene glycol 3350 17 Gram(s) daily  senna 2 Tablet(s) at bedtime PRN  simethicone 80 milliGRAM(s) three times a day PRN  traMADol 50 milliGRAM(s) three times a day PRN      RECENT LABS/IMAGING                        ---------  PHYSICAL EXAM  Constitutional - NAD, Comfortable  Pulm - Breathing comfortably, No wheezing  Abd - Obese, soft, NTND  Extremities - B/L AJIT BRACE APPLIED  Neurologic Exam -                    Cognitive - Awake, Alert     Communication - Fluent     Motor - No focal deficits     Sensory - Intact to LT  Psychiatric - Mood WNL, Affect WNL    ASSESSMENT/PLAN  31y Male with functional deficits s/p B/L PATELLA TENDON RUPTURE ORIF + NIKKI.  Continue current medical management  Pain - Tylenol PRN; CELECOXIB; TRAMADOL  DVT PPX - enoxaparin Injectable 40 milliGRAM(s)   Active issues - NONE  Continue 3hrs a day of comprehensive rehab program.

## 2022-05-08 NOTE — PROGRESS NOTE ADULT - SUBJECTIVE AND OBJECTIVE BOX
HPI:  30 yo male w/ PSHx of right knee ORIF to ED [facility] on 4/24 w/ bilateral knee pain. Earlier that day playing basketball, attempted to jump but fell onto his knees. He experienced severe pain and was unable to ambulate. In ED: CT shows concern for patellar ligament rupture in L knee and fractured hardware in R knee with small hematoma. Ortho was consulted. Underwent bilateral knee tendon reconstruction 4/27. No complications. WBAT [place]. Pt was evaluated by PT with recommendation for acute rehab. Cleared for dc on 5/2.    (02 May 2022 13:12)      Subjective    no new complaints      PAST MEDICAL & SURGICAL HISTORY:  History of open reduction and internal fixation (ORIF) procedure  Right knee        MedsMEDICATIONS  (STANDING):  acetaminophen     Tablet .. 650 milliGRAM(s) Oral every 8 hours  benzocaine 15 mG/menthol 3.6 mG Lozenge 1 Lozenge Oral five times a day  celecoxib 200 milliGRAM(s) Oral daily  enoxaparin Injectable 40 milliGRAM(s) SubCutaneous <User Schedule>  mupirocin 2% Ointment 1 Application(s) Topical <User Schedule>  polyethylene glycol 3350 17 Gram(s) Oral daily    MEDICATIONS  (PRN):  famotidine    Tablet 20 milliGRAM(s) Oral two times a day PRN Dyspepsia  senna 2 Tablet(s) Oral at bedtime PRN Constipation  simethicone 80 milliGRAM(s) Chew three times a day PRN Dyspepsia  traMADol 50 milliGRAM(s) Oral three times a day PRN Severe Pain (7 - 10)      Vital Signs Last 24 Hrs  T(C): 36.8 (08 May 2022 07:23), Max: 36.8 (07 May 2022 20:07)  T(F): 98.3 (08 May 2022 07:23), Max: 98.3 (07 May 2022 20:07)  HR: 89 (08 May 2022 07:23) (89 - 98)  BP: 123/82 (08 May 2022 07:23) (123/82 - 144/81)  BP(mean): --  RR: 16 (08 May 2022 07:23) (16 - 16)  SpO2: 99% (08 May 2022 07:23) (97% - 99%)  I&O's Summary      PHYSICAL EXAM:  GENERAL: NAD  NECK: Supple  NERVOUS SYSTEM:  awake and alert  HEART: S1s2 NL , RRR  CHEST/LUNG: Clear to percussion bilaterally  ABDOMEN: Soft, Nontender, Nondistended; Bowel sounds present  EXTREMITIES:  No edema      LABS:              RVP:          Tox:           CAPILLARY BLOOD GLUCOSE          Imaging Personally Reviewed:  [ ] YES  [ ] NO        Care Discussed with Consultants/Other Providers [ x] YES  [ ] NO

## 2022-05-09 ENCOUNTER — TRANSCRIPTION ENCOUNTER (OUTPATIENT)
Age: 32
End: 2022-05-09

## 2022-05-09 LAB
ALBUMIN SERPL ELPH-MCNC: 3.6 G/DL — SIGNIFICANT CHANGE UP (ref 3.3–5)
ALP SERPL-CCNC: 81 U/L — SIGNIFICANT CHANGE UP (ref 40–120)
ALT FLD-CCNC: 42 U/L — SIGNIFICANT CHANGE UP (ref 10–45)
ANION GAP SERPL CALC-SCNC: 11 MMOL/L — SIGNIFICANT CHANGE UP (ref 5–17)
AST SERPL-CCNC: 27 U/L — SIGNIFICANT CHANGE UP (ref 10–40)
BILIRUB SERPL-MCNC: 0.4 MG/DL — SIGNIFICANT CHANGE UP (ref 0.2–1.2)
BUN SERPL-MCNC: 21 MG/DL — SIGNIFICANT CHANGE UP (ref 7–23)
CALCIUM SERPL-MCNC: 9.5 MG/DL — SIGNIFICANT CHANGE UP (ref 8.4–10.5)
CHLORIDE SERPL-SCNC: 104 MMOL/L — SIGNIFICANT CHANGE UP (ref 96–108)
CO2 SERPL-SCNC: 26 MMOL/L — SIGNIFICANT CHANGE UP (ref 22–31)
CREAT SERPL-MCNC: 1.12 MG/DL — SIGNIFICANT CHANGE UP (ref 0.5–1.3)
EGFR: 91 ML/MIN/1.73M2 — SIGNIFICANT CHANGE UP
GLUCOSE SERPL-MCNC: 94 MG/DL — SIGNIFICANT CHANGE UP (ref 70–99)
HCT VFR BLD CALC: 46.9 % — SIGNIFICANT CHANGE UP (ref 39–50)
HGB BLD-MCNC: 15.4 G/DL — SIGNIFICANT CHANGE UP (ref 13–17)
MCHC RBC-ENTMCNC: 26.8 PG — LOW (ref 27–34)
MCHC RBC-ENTMCNC: 32.8 GM/DL — SIGNIFICANT CHANGE UP (ref 32–36)
MCV RBC AUTO: 81.6 FL — SIGNIFICANT CHANGE UP (ref 80–100)
NRBC # BLD: 0 /100 WBCS — SIGNIFICANT CHANGE UP (ref 0–0)
PLATELET # BLD AUTO: 439 K/UL — HIGH (ref 150–400)
POTASSIUM SERPL-MCNC: 4.6 MMOL/L — SIGNIFICANT CHANGE UP (ref 3.5–5.3)
POTASSIUM SERPL-SCNC: 4.6 MMOL/L — SIGNIFICANT CHANGE UP (ref 3.5–5.3)
PROT SERPL-MCNC: 8.4 G/DL — HIGH (ref 6–8.3)
RBC # BLD: 5.75 M/UL — SIGNIFICANT CHANGE UP (ref 4.2–5.8)
RBC # FLD: 12.8 % — SIGNIFICANT CHANGE UP (ref 10.3–14.5)
SODIUM SERPL-SCNC: 141 MMOL/L — SIGNIFICANT CHANGE UP (ref 135–145)
WBC # BLD: 10.29 K/UL — SIGNIFICANT CHANGE UP (ref 3.8–10.5)
WBC # FLD AUTO: 10.29 K/UL — SIGNIFICANT CHANGE UP (ref 3.8–10.5)

## 2022-05-09 PROCEDURE — 99232 SBSQ HOSP IP/OBS MODERATE 35: CPT | Mod: GC

## 2022-05-09 PROCEDURE — 99232 SBSQ HOSP IP/OBS MODERATE 35: CPT

## 2022-05-09 RX ORDER — TRAMADOL HYDROCHLORIDE 50 MG/1
1 TABLET ORAL
Qty: 0 | Refills: 0 | DISCHARGE
Start: 2022-05-09

## 2022-05-09 RX ORDER — ACETAMINOPHEN 500 MG
2 TABLET ORAL
Qty: 0 | Refills: 0 | DISCHARGE
Start: 2022-05-09

## 2022-05-09 RX ORDER — CELECOXIB 200 MG/1
1 CAPSULE ORAL
Qty: 0 | Refills: 0 | DISCHARGE
Start: 2022-05-09

## 2022-05-09 RX ADMIN — Medication 650 MILLIGRAM(S): at 06:07

## 2022-05-09 RX ADMIN — CELECOXIB 200 MILLIGRAM(S): 200 CAPSULE ORAL at 11:39

## 2022-05-09 RX ADMIN — ENOXAPARIN SODIUM 40 MILLIGRAM(S): 100 INJECTION SUBCUTANEOUS at 07:58

## 2022-05-09 RX ADMIN — Medication 1 PACKET(S): at 15:13

## 2022-05-09 RX ADMIN — Medication 650 MILLIGRAM(S): at 06:56

## 2022-05-09 RX ADMIN — Medication 650 MILLIGRAM(S): at 16:00

## 2022-05-09 RX ADMIN — Medication 650 MILLIGRAM(S): at 15:13

## 2022-05-09 RX ADMIN — CELECOXIB 200 MILLIGRAM(S): 200 CAPSULE ORAL at 14:16

## 2022-05-09 NOTE — DISCHARGE NOTE PROVIDER - NSDCCPCAREPLAN_GEN_ALL_CORE_FT
PRINCIPAL DISCHARGE DIAGNOSIS  Diagnosis: Traumatic rupture of tendon  Assessment and Plan of Treatment: s/p bilateral knee tendon reconstruction. Continue bracing as per ortho. Pain medications as needed. Follow up Dr Coffey this week.       PRINCIPAL DISCHARGE DIAGNOSIS  Diagnosis: Traumatic rupture of tendon  Assessment and Plan of Treatment: s/p bilateral knee tendon reconstruction. Continue bracing as per ortho. Pain medications as needed. Follow up Dr Coffey this week. Celebrex for another 7 days.       PRINCIPAL DISCHARGE DIAGNOSIS  Diagnosis: Traumatic rupture of tendon  Assessment and Plan of Treatment: s/p bilateral knee tendon reconstruction. Continue bracing as per ortho. Pain medications as needed. Follow up Dr Coffey this week. Celebrex for another 7 days.      SECONDARY DISCHARGE DIAGNOSES  Diagnosis: History of sinus tachycardia  Assessment and Plan of Treatment: follow up PCP in 1 week. Stay well hydrated.

## 2022-05-09 NOTE — DISCHARGE NOTE PROVIDER - NSDCMRMEDTOKEN_GEN_ALL_CORE_FT
acetaminophen 325 mg oral tablet: 2 tab(s) orally every 8 hours  celecoxib 200 mg oral capsule: 1 cap(s) orally once a day  folic acid 1 mg oral tablet: 1 tab(s) orally once a day  traMADol 50 mg oral tablet: 1 tab(s) orally 3 times a day, As needed, Severe Pain (7 - 10)   acetaminophen 325 mg oral tablet: 2 tab(s) orally every 8 hours  celecoxib 200 mg oral capsule: 1 cap(s) orally once a day  folic acid 1 mg oral tablet: 1 tab(s) orally once a day   acetaminophen 325 mg oral tablet: 2 tab(s) orally every 8 hours, As Needed  celecoxib 200 mg oral capsule: 1 cap(s) orally once a day  folic acid 1 mg oral tablet: 1 tab(s) orally once a day

## 2022-05-09 NOTE — PROGRESS NOTE ADULT - SUBJECTIVE AND OBJECTIVE BOX
Patient is a 31y old  Male who presents with a chief complaint of S/p Bilateral patellar tendon repair with NIKKI (08 May 2022 11:32)      Patient seen and examined at bedside.  No overnight events  No complaints this morning    ROS:  CONSTITUTIONAL: No fever, weight loss, or fatigue  RESPIRATORY: No cough, wheezing, chills or hemoptysis; No shortness of breath  CARDIOVASCULAR: No chest pain, palpitations, dizziness, or leg swelling  GASTROINTESTINAL: No abdominal or epigastric pain. No nausea, vomiting, or hematemesis; No diarrhea or constipation. No melena or hematochezia.  NEUROLOGICAL: No headaches, memory loss, loss of strength, numbness, or tremors      ALLERGIES:  No Known Allergies    MEDICATIONS  (STANDING):  acetaminophen     Tablet .. 650 milliGRAM(s) Oral every 8 hours  benzocaine 15 mG/menthol 3.6 mG Lozenge 1 Lozenge Oral five times a day  celecoxib 200 milliGRAM(s) Oral daily  enoxaparin Injectable 40 milliGRAM(s) SubCutaneous <User Schedule>  mupirocin 2% Ointment 1 Application(s) Topical <User Schedule>  psyllium Powder 1 Packet(s) Oral daily    MEDICATIONS  (PRN):  famotidine    Tablet 20 milliGRAM(s) Oral two times a day PRN Dyspepsia  simethicone 80 milliGRAM(s) Chew three times a day PRN Dyspepsia  traMADol 50 milliGRAM(s) Oral three times a day PRN Severe Pain (7 - 10)    Vital Signs Last 24 Hrs  T(F): 98.2 (09 May 2022 07:54), Max: 98.3 (08 May 2022 20:04)  HR: 98 (09 May 2022 07:54) (82 - 98)  BP: 122/74 (09 May 2022 07:54) (122/74 - 144/82)  RR: 14 (09 May 2022 07:54) (14 - 16)  SpO2: 95% (09 May 2022 07:54) (95% - 98%)  I&O's Summary        PHYSICAL EXAM:  GENERAL: NAD  HENT:  Atraumatic, Normocephalic; No tonsillar erythema, exudates, or enlargement; Moist mucous membranes;   EYES: EOMI, PERRLA, conjunctiva and sclera clear, no lid-lag  NECK: Supple, No JVD, Normal thyroid  CHEST/LUNG: Clear to percussion bilaterally; No rales, rhonchi, wheezing, or rubs; normal respiratory effort, no intercostal retractions  HEART: Regular rate and rhythm; No murmurs, rubs, or gallops; No pitting edema  ABDOMEN: Soft, Nontender, Nondistended; Bowel sounds present; No HSM  MUSCULOSKELETAL/EXTREMITIES:  2+ Peripheral Pulses, No clubbing or digital cyanosis  PSYCH: Appropriate affect, Alert & Awake; Good judgement    LABS:                        15.4   10.29 )-----------( 439      ( 09 May 2022 05:25 )             46.9       05-09    141  |  104  |  21  ----------------------------<  94  4.6   |  26  |  1.12    Ca    9.5      09 May 2022 05:25    TPro  8.4  /  Alb  3.6  /  TBili  0.4  /  DBili  x   /  AST  27  /  ALT  42  /  AlkPhos  81  05-09                                      COVID-19 PCR: Jocytec (05-01-22 @ 07:59)  COVID-19 PCR: NotDetec (04-24-22 @ 13:34)      Care Discussed with Rehab Attending and Other Providers

## 2022-05-09 NOTE — DISCHARGE NOTE PROVIDER - NSDCFUSCHEDAPPT_GEN_ALL_CORE_FT
Caryn Ford  [facility]  INTMED [address]  Scheduled Appointment: 06/08/2022

## 2022-05-09 NOTE — DISCHARGE NOTE PROVIDER - CARE PROVIDER_API CALL
Domitila Coffey)  Orthopaedic Surgery  [phone] [place] [address], [address]  [place] [address]  Phone: [phone]  Fax: [phone]  Follow Up Time:

## 2022-05-09 NOTE — PROGRESS NOTE ADULT - SUBJECTIVE AND OBJECTIVE BOX
CHIEF COMPLAINT: S/p Bilateral patellar tendon repair with NIKKI      HISTORY OF PRESENT ILLNESS  30 yo male w/ PSHx of right knee ORIF to ED [facility] on 4/24 w/ bilateral knee pain. Earlier that day playing basketball, attempted to jump but fell onto his knees. He experienced severe pain and was unable to ambulate. In ED: CT shows concern for patellar ligament rupture in L knee and fractured hardware in R knee with small hematoma. Ortho was consulted. Underwent bilateral knee tendon reconstruction 4/27. No complications. WBAT [place]. Pt was evaluated by PT with recommendation for acute rehab. Cleared for dc on 5/2.      Subjective/ROS:  loose bowels x 2 days- 2x/day- on no bowel meds  Pain controlled at present- tylenol ,tramadol now PRN  no dizziness, no headaches  no Nausea, no Vomiting  No SOB, No Chest pain  -mild tachycardia at rest 100 persists    MEDICATIONS  (STANDING):  acetaminophen     Tablet .. 650 milliGRAM(s) Oral every 8 hours  benzocaine 15 mG/menthol 3.6 mG Lozenge 1 Lozenge Oral five times a day  celecoxib 200 milliGRAM(s) Oral daily  enoxaparin Injectable 40 milliGRAM(s) SubCutaneous <User Schedule>  mupirocin 2% Ointment 1 Application(s) Topical <User Schedule>  psyllium Powder 1 Packet(s) Oral daily    MEDICATIONS  (PRN):  famotidine    Tablet 20 milliGRAM(s) Oral two times a day PRN Dyspepsia  simethicone 80 milliGRAM(s) Chew three times a day PRN Dyspepsia  traMADol 50 milliGRAM(s) Oral three times a day PRN Severe Pain (7 - 10)                            15.4   10.29 )-----------( 439      ( 09 May 2022 05:25 )             46.9     05-09    141  |  104  |  21  ----------------------------<  94  4.6   |  26  |  1.12    Ca    9.5      09 May 2022 05:25    TPro  8.4<H>  /  Alb  3.6  /  TBili  0.4  /  DBili  x   /  AST  27  /  ALT  42  /  AlkPhos  81  05-09        CAPILLARY BLOOD GLUCOSE          Vital Signs Last 24 Hrs  T(C): 36.8 (09 May 2022 07:54), Max: 36.8 (08 May 2022 20:04)  T(F): 98.2 (09 May 2022 07:54), Max: 98.3 (08 May 2022 20:04)  HR: 98 (09 May 2022 07:54) (82 - 98)  BP: 122/74 (09 May 2022 07:54) (122/74 - 144/82)  BP(mean): --  RR: 14 (09 May 2022 07:54) (14 - 16)  SpO2: 95% (09 May 2022 07:54) (95% - 98%)      General: NAD, working in therapy                                HEENT: NC/AT, EOMI  Cardio: RRR, Normal S1-S2, No M/G/R                              Pulm: No Respiratory Distress,  Lungs CTAB                        Abdomen: ND/NT, Soft, BS+                                                                                   Ext: No edema, No calf tenderness Maynor LES aquacel off- telfa over incisions- incisions inspected- dry, intact                                                            Decubitus Ulcers: None Present     Neurological Examination    Cognitive: AAO x 3                                                                         CN II - XII  intact                                                                     Sensory: Intact to light touch                                                                                           Motor    LEFT    UE: SF [5/5], EF [5/5], EE [5/5], WE [5/5],  [wnl]  RIGHT UE: SF [5/5], EF [5/5], EE [5/5], WE [5/5],  [wnl]  LEFT    LE:  HF [NT], KE [NT], DF [5/5], EHL [5/5],  PF [5/5]  RIGHT LE:  HF [NT], KE [NT], DF [5/5], EHL [5/5],  PF [5/5]    + SLR Both LEs    Reflex:  2 +UES, ankle maynor      IDT meeting on 5/4    SW: Apt 4STE, 15STI, lives w/wife    OT:  eating set up  grooming set up  UBD/LBD set up/min A  toileting CG/min A  tub/shower min /CG  bathing min A    PT:  amb 50ft RW CG  transfers total, transfers  stairs 4st min A    SLP na    tentative dc home w/HC on 5/17    Continue comprehensive acute rehab program consisting of 3hrs/day of OT/PT.

## 2022-05-09 NOTE — DISCHARGE NOTE PROVIDER - NSDCACTIVITY_GEN_ALL_CORE
Sex allowed/Do not drive or operate machinery/Do not make important decisions/Walking - Indoors allowed/No heavy lifting/straining/Walking - Outdoors allowed/Follow Instructions Provided by your Surgical Team

## 2022-05-09 NOTE — DISCHARGE NOTE PROVIDER - HOSPITAL COURSE
30 yo male w/ PSHx of right knee ORIF to ED [facility] on 4/24 w/ bilateral knee pain. Earlier that day playing basketball, attempted to jump but fell onto his knees. He experienced severe pain and was unable to ambulate. In ED: CT shows concern for patellar ligament rupture in L knee and fractured hardware in R knee with small hematoma. Ortho was consulted. Underwent bilateral knee tendon reconstruction 4/27. No complications. WBAT [place]. Pt was evaluated by PT with recommendation for acute rehab. Cleared for dc on 5/2. At [facility] rehab patient completed comprehensive PT OT program and reached his rehab goals on 5/11. While at rehab patient tolerated Lovenox. Bowel regimen instituted. Cleared for dc on 5/11.       32 yo male w/ PSHx of right knee ORIF to ED [facility] on 4/24 w/ bilateral knee pain. Earlier that day playing basketball, attempted to jump but fell onto his knees. He experienced severe pain and was unable to ambulate. In ED: CT shows concern for patellar ligament rupture in L knee and fractured hardware in R knee with small hematoma. Ortho was consulted. Underwent bilateral knee tendon reconstruction 4/27. No complications. WBAT [place]. Pt was evaluated by PT with recommendation for acute rehab. Cleared for dc on 5/2. At [facility] rehab patient completed comprehensive PT OT program and reached his rehab goals on 5/11. While at rehab patient tolerated Lovenox. Bowel regimen instituted. Cleared for dc on 5/11. Celebrex x 3weeks per ortho.

## 2022-05-10 PROCEDURE — 99232 SBSQ HOSP IP/OBS MODERATE 35: CPT

## 2022-05-10 PROCEDURE — 99232 SBSQ HOSP IP/OBS MODERATE 35: CPT | Mod: GC

## 2022-05-10 RX ORDER — CELECOXIB 200 MG/1
1 CAPSULE ORAL
Qty: 7 | Refills: 0
Start: 2022-05-10 | End: 2022-05-16

## 2022-05-10 RX ORDER — TRAMADOL HYDROCHLORIDE 50 MG/1
50 TABLET ORAL THREE TIMES A DAY
Refills: 0 | Status: DISCONTINUED | OUTPATIENT
Start: 2022-05-10 | End: 2022-05-11

## 2022-05-10 RX ADMIN — CELECOXIB 200 MILLIGRAM(S): 200 CAPSULE ORAL at 11:51

## 2022-05-10 RX ADMIN — CELECOXIB 200 MILLIGRAM(S): 200 CAPSULE ORAL at 11:21

## 2022-05-10 RX ADMIN — BENZOCAINE AND MENTHOL 1 LOZENGE: 5; 1 LIQUID ORAL at 21:54

## 2022-05-10 RX ADMIN — Medication 650 MILLIGRAM(S): at 15:06

## 2022-05-10 RX ADMIN — Medication 650 MILLIGRAM(S): at 05:49

## 2022-05-10 RX ADMIN — Medication 1 PACKET(S): at 11:21

## 2022-05-10 RX ADMIN — BENZOCAINE AND MENTHOL 1 LOZENGE: 5; 1 LIQUID ORAL at 08:11

## 2022-05-10 RX ADMIN — ENOXAPARIN SODIUM 40 MILLIGRAM(S): 100 INJECTION SUBCUTANEOUS at 07:48

## 2022-05-10 RX ADMIN — BENZOCAINE AND MENTHOL 1 LOZENGE: 5; 1 LIQUID ORAL at 11:21

## 2022-05-10 RX ADMIN — Medication 650 MILLIGRAM(S): at 21:53

## 2022-05-10 RX ADMIN — MUPIROCIN 1 APPLICATION(S): 20 OINTMENT TOPICAL at 07:48

## 2022-05-10 RX ADMIN — BENZOCAINE AND MENTHOL 1 LOZENGE: 5; 1 LIQUID ORAL at 23:24

## 2022-05-10 RX ADMIN — Medication 650 MILLIGRAM(S): at 06:32

## 2022-05-10 RX ADMIN — Medication 650 MILLIGRAM(S): at 14:36

## 2022-05-10 RX ADMIN — BENZOCAINE AND MENTHOL 1 LOZENGE: 5; 1 LIQUID ORAL at 17:05

## 2022-05-10 NOTE — CHART NOTE - NSCHARTNOTEFT_GEN_A_CORE
Nutrition Follow Up Note  Hospital Course   (Per Electronic Medical Record)    Source:  Patient [X]  Medical Record [X]      Diet:   Regular Diet (IDDSI Level 7) w/ Thin Liquids (IDDSI Level 0)  Tolerates Diet Consistency Well  No Chewing/Swallowing Difficulties  No Recent Nausea, Vomiting or Constipation & Some Recent Diarrhea  (as Per Patient)  Consumes % of Meals (as Per Documentation) - States Good PO Intake/Appetite   Education Provided on Proper Nutrition    Enteral/Parenteral Nutrition: Not Applicable    Current Weight: 197lb on 5/8  Weights Currently Stable @This Time   Obtain Weights Weekly    Pertinent Medications: MEDICATIONS  (STANDING):  acetaminophen     Tablet .. 650 milliGRAM(s) Oral every 8 hours  benzocaine 15 mG/menthol 3.6 mG Lozenge 1 Lozenge Oral five times a day  celecoxib 200 milliGRAM(s) Oral daily  enoxaparin Injectable 40 milliGRAM(s) SubCutaneous <User Schedule>  mupirocin 2% Ointment 1 Application(s) Topical <User Schedule>  psyllium Powder 1 Packet(s) Oral daily    MEDICATIONS  (PRN):  famotidine    Tablet 20 milliGRAM(s) Oral two times a day PRN Dyspepsia  simethicone 80 milliGRAM(s) Chew three times a day PRN Dyspepsia  traMADol 50 milliGRAM(s) Oral three times a day PRN Severe Pain (7 - 10)    Pertinent Labs:  05-09 Na141 mmol/L Glu 94 mg/dL K+ 4.6 mmol/L Cr  1.12 mg/dL BUN 21 mg/dL 05-09 Alb 3.6 g/dL    Skin: No Pressure Ulcers  Surgical Incision on Knees  (as Per Nursing Flow Sheet)     Edema: None Noted (as Per Documentation)     Last Bowel Movement: on 5/9    Estimated Needs:   [X] No Change Since Previous Assessment    Previous Nutrition Diagnosis:   No Active Nutrition Dx @ This Present Time    Nutrition Diagnosis is [X] Not Applicable     New Nutrition Diagnosis: [X] Not Applicable    Interventions:   1. Recommend Continue Nutrition Plan of Care     Monitoring & Evaluation:   [X] Weights   [X] PO Intake   [X] Skin Integrity   [X] Follow Up (Per Protocol)  [X] Tolerance to Diet Prescription   [X] Other: Labs     Registered Dietitian/Nutritionist Remains Available.  Jose Raul Wetzel RDN    Pager #[phone]  Phone# [phone]

## 2022-05-10 NOTE — PROGRESS NOTE ADULT - SUBJECTIVE AND OBJECTIVE BOX
Patient is a 31y old  Male who presents with a chief complaint of S/p Bilateral patellar tendon repair with NIKKI (09 May 2022 13:54)      Patient seen and examined at bedside.  No overnight events  No complaints this morning    ROS:  CONSTITUTIONAL: No fever, weight loss, or fatigue  MUSCULOSKELETAL: No joint pain or swelling; No muscle, back, or extremity pain    ALLERGIES:  No Known Allergies    MEDICATIONS  (STANDING):  acetaminophen     Tablet .. 650 milliGRAM(s) Oral every 8 hours  benzocaine 15 mG/menthol 3.6 mG Lozenge 1 Lozenge Oral five times a day  celecoxib 200 milliGRAM(s) Oral daily  enoxaparin Injectable 40 milliGRAM(s) SubCutaneous <User Schedule>  mupirocin 2% Ointment 1 Application(s) Topical <User Schedule>  psyllium Powder 1 Packet(s) Oral daily    MEDICATIONS  (PRN):  famotidine    Tablet 20 milliGRAM(s) Oral two times a day PRN Dyspepsia  simethicone 80 milliGRAM(s) Chew three times a day PRN Dyspepsia  traMADol 50 milliGRAM(s) Oral three times a day PRN Severe Pain (7 - 10)    Vital Signs Last 24 Hrs  T(F): 97.3 (10 May 2022 07:59), Max: 98.3 (09 May 2022 21:09)  HR: 100 (10 May 2022 07:59) (97 - 100)  BP: 136/88 (10 May 2022 07:59) (132/90 - 136/88)  RR: 14 (10 May 2022 07:59) (14 - 15)  SpO2: 98% (10 May 2022 07:59) (98% - 98%)  I&O's Summary    PHYSICAL EXAM:  GENERAL: NAD  HENT:  Atraumatic, Normocephalic; No tonsillar erythema, exudates, or enlargement; Moist mucous membranes;   EYES: EOMI, PERRLA, conjunctiva and sclera clear, no lid-lag  NECK: Supple, No JVD, Normal thyroid  CHEST/LUNG: Clear to percussion bilaterally; No rales, rhonchi, wheezing, or rubs; normal respiratory effort, no intercostal retractions  HEART: Regular rate and rhythm; No murmurs, rubs, or gallops; No pitting edema  ABDOMEN: Soft, Nontender, Nondistended; Bowel sounds present; No HSM  MUSCULOSKELETAL/EXTREMITIES:  2+ Peripheral Pulses, No clubbing or digital cyanosis; Brace on both legs  PSYCH: Appropriate affect, Alert & Awake; Good judgement    LABS:                        15.4   10.29 )-----------( 439      ( 09 May 2022 05:25 )             46.9       05-09    141  |  104  |  21  ----------------------------<  94  4.6   |  26  |  1.12    Ca    9.5      09 May 2022 05:25    TPro  8.4  /  Alb  3.6  /  TBili  0.4  /  DBili  x   /  AST  27  /  ALT  42  /  AlkPhos  81  05-09     COVID-19 PCR: Roz (05-01-22 @ 07:59)  COVID-19 PCR: Roz (04-24-22 @ 13:34)      Care Discussed with Rehab Attending and Other Providers

## 2022-05-10 NOTE — PROGRESS NOTE ADULT - SUBJECTIVE AND OBJECTIVE BOX
CHIEF COMPLAINT: S/p Bilateral patellar tendon repair with NIKKI      HISTORY OF PRESENT ILLNESS  30 yo male w/ PSHx of right knee ORIF to ED [facility] on 4/24 w/ bilateral knee pain. Earlier that day playing basketball, attempted to jump but fell onto his knees. He experienced severe pain and was unable to ambulate. In ED: CT shows concern for patellar ligament rupture in L knee and fractured hardware in R knee with small hematoma. Ortho was consulted. Underwent bilateral knee tendon reconstruction 4/27. No complications. WBAT [place]. Pt was evaluated by PT with recommendation for acute rehab. Cleared for dc on 5/2.      Subjective/ROS:  still with loose bowels x3 yesterday but more solid components- no cramping, no pain  Pain controlled at present  no dizziness, no headaches  no Nausea, no Vomiting  No SOB, No Chest pain  -mild tachycardia at rest 96 persists    MEDICATIONS  (STANDING):  acetaminophen     Tablet .. 650 milliGRAM(s) Oral every 8 hours  benzocaine 15 mG/menthol 3.6 mG Lozenge 1 Lozenge Oral five times a day  celecoxib 200 milliGRAM(s) Oral daily  enoxaparin Injectable 40 milliGRAM(s) SubCutaneous <User Schedule>  mupirocin 2% Ointment 1 Application(s) Topical <User Schedule>  psyllium Powder 1 Packet(s) Oral daily    MEDICATIONS  (PRN):  famotidine    Tablet 20 milliGRAM(s) Oral two times a day PRN Dyspepsia  simethicone 80 milliGRAM(s) Chew three times a day PRN Dyspepsia  traMADol 50 milliGRAM(s) Oral three times a day PRN Severe Pain (7 - 10)                            15.4   10.29 )-----------( 439      ( 09 May 2022 05:25 )             46.9     05-09    141  |  104  |  21  ----------------------------<  94  4.6   |  26  |  1.12    Ca    9.5      09 May 2022 05:25    TPro  8.4<H>  /  Alb  3.6  /  TBili  0.4  /  DBili  x   /  AST  27  /  ALT  42  /  AlkPhos  81  05-09        CAPILLARY BLOOD GLUCOSE          Vital Signs Last 24 Hrs  T(C): 36.3 (10 May 2022 07:59), Max: 36.8 (09 May 2022 21:09)  T(F): 97.3 (10 May 2022 07:59), Max: 98.3 (09 May 2022 21:09)  HR: 100 (10 May 2022 07:59) (97 - 100)  BP: 136/88 (10 May 2022 07:59) (132/90 - 136/88)  BP(mean): 98 (10 May 2022 07:59) (98 - 98)  RR: 14 (10 May 2022 07:59) (14 - 15)  SpO2: 98% (10 May 2022 07:59) (98% - 98%)      General: NAD, working in therapy                                HEENT: NC/AT, EOMI  Cardio: RRR, Normal S1-S2, No M/G/R                              Pulm: No Respiratory Distress,  Lungs CTAB                        Abdomen: ND/NT, Soft, BS+                                                                                   Ext: No edema, No calf tenderness Maynor LES aquacel off- telfa over incisions- incisions dry, intact                                                            Decubitus Ulcers: None Present     Neurological Examination    Cognitive: AAO x 3                                                                         CN II - XII  intact                                                                     Sensory: Intact to light touch                                                                                           Motor    LEFT    UE: SF [5/5], EF [5/5], EE [5/5], WE [5/5],  [wnl]  RIGHT UE: SF [5/5], EF [5/5], EE [5/5], WE [5/5],  [wnl]  LEFT    LE:  HF [NT], KE [NT], DF [5/5], EHL [5/5],  PF [5/5]  RIGHT LE:  HF [NT], KE [NT], DF [5/5], EHL [5/5],  PF [5/5]    able to SLR Both LEs    Reflex:  2 +UES, ankle maynor      IDT meeting on 5/4    SW: Apt 4STE, 15STI, lives w/wife    OT:  eating set up  grooming set up  UBD/LBD set up/min A  toileting CG/min A  tub/shower min /CG  bathing min A    PT:  amb 50ft RW CG  transfers total, transfers  stairs 4st min A    SLP na    tentative dc home w/HC on 5/17    Continue comprehensive acute rehab program consisting of 3hrs/day of OT/PT.

## 2022-05-11 ENCOUNTER — TRANSCRIPTION ENCOUNTER (OUTPATIENT)
Age: 32
End: 2022-05-11

## 2022-05-11 VITALS
OXYGEN SATURATION: 98 % | RESPIRATION RATE: 16 BRPM | HEART RATE: 95 BPM | SYSTOLIC BLOOD PRESSURE: 137 MMHG | DIASTOLIC BLOOD PRESSURE: 85 MMHG | TEMPERATURE: 98 F

## 2022-05-11 PROCEDURE — 97535 SELF CARE MNGMENT TRAINING: CPT

## 2022-05-11 PROCEDURE — 85027 COMPLETE CBC AUTOMATED: CPT

## 2022-05-11 PROCEDURE — 97167 OT EVAL HIGH COMPLEX 60 MIN: CPT

## 2022-05-11 PROCEDURE — 97110 THERAPEUTIC EXERCISES: CPT

## 2022-05-11 PROCEDURE — 80053 COMPREHEN METABOLIC PANEL: CPT

## 2022-05-11 PROCEDURE — 85025 COMPLETE CBC W/AUTO DIFF WBC: CPT

## 2022-05-11 PROCEDURE — 99238 HOSP IP/OBS DSCHRG MGMT 30/<: CPT | Mod: GC

## 2022-05-11 PROCEDURE — 36415 COLL VENOUS BLD VENIPUNCTURE: CPT

## 2022-05-11 PROCEDURE — 97530 THERAPEUTIC ACTIVITIES: CPT

## 2022-05-11 PROCEDURE — 97542 WHEELCHAIR MNGMENT TRAINING: CPT

## 2022-05-11 PROCEDURE — 97163 PT EVAL HIGH COMPLEX 45 MIN: CPT

## 2022-05-11 PROCEDURE — 99232 SBSQ HOSP IP/OBS MODERATE 35: CPT

## 2022-05-11 PROCEDURE — 97116 GAIT TRAINING THERAPY: CPT

## 2022-05-11 PROCEDURE — 84443 ASSAY THYROID STIM HORMONE: CPT

## 2022-05-11 RX ADMIN — Medication 650 MILLIGRAM(S): at 06:06

## 2022-05-11 RX ADMIN — BENZOCAINE AND MENTHOL 1 LOZENGE: 5; 1 LIQUID ORAL at 07:18

## 2022-05-11 RX ADMIN — MUPIROCIN 1 APPLICATION(S): 20 OINTMENT TOPICAL at 07:17

## 2022-05-11 RX ADMIN — ENOXAPARIN SODIUM 40 MILLIGRAM(S): 100 INJECTION SUBCUTANEOUS at 07:17

## 2022-05-11 NOTE — PROGRESS NOTE ADULT - PROVIDER SPECIALTY LIST ADULT
Hospitalist
Physiatry
Hospitalist
Physiatry
Hospitalist
Physiatry
Rehab Medicine
Rehab Medicine

## 2022-05-11 NOTE — PROGRESS NOTE ADULT - SUBJECTIVE AND OBJECTIVE BOX
Patient is a 31y old  Male who presents with a chief complaint of S/p Bilateral patellar tendon repair with NIKKI (10 May 2022 12:19)    Patient seen and examined at bedside.  No overnight events  No complaints this morning  For D/C today    ROS:  CONSTITUTIONAL: No fever, weight loss, or fatigue  MUSCULOSKELETAL: No joint pain or swelling; No muscle, back, or extremity pain      ALLERGIES:  No Known Allergies    MEDICATIONS  (STANDING):  acetaminophen     Tablet .. 650 milliGRAM(s) Oral every 8 hours  benzocaine 15 mG/menthol 3.6 mG Lozenge 1 Lozenge Oral five times a day  celecoxib 200 milliGRAM(s) Oral daily  enoxaparin Injectable 40 milliGRAM(s) SubCutaneous <User Schedule>  mupirocin 2% Ointment 1 Application(s) Topical <User Schedule>  psyllium Powder 1 Packet(s) Oral daily    MEDICATIONS  (PRN):  famotidine    Tablet 20 milliGRAM(s) Oral two times a day PRN Dyspepsia  simethicone 80 milliGRAM(s) Chew three times a day PRN Dyspepsia  traMADol 50 milliGRAM(s) Oral three times a day PRN Severe Pain (7 - 10)    Vital Signs Last 24 Hrs  T(F): 98 (11 May 2022 07:39), Max: 99.2 (10 May 2022 22:09)  HR: 95 (11 May 2022 07:39) (95 - 96)  BP: 137/85 (11 May 2022 07:39) (137/85 - 141/93)  RR: 16 (11 May 2022 07:39) (16 - 16)  SpO2: 98% (11 May 2022 07:39) (98% - 98%)  I&O's Summary    10 May 2022 07:01  -  11 May 2022 07:00  --------------------------------------------------------  IN: 0 mL / OUT: 600 mL / NET: -600 mL    PHYSICAL EXAM:  GENERAL: NAD  HENT:  Atraumatic, Normocephalic; No tonsillar erythema, exudates, or enlargement; Moist mucous membranes;   EYES: EOMI, PERRLA, conjunctiva and sclera clear, no lid-lag  NECK: Supple, No JVD, Normal thyroid  CHEST/LUNG: Clear to percussion bilaterally; No rales, rhonchi, wheezing, or rubs; normal respiratory effort, no intercostal retractions  HEART: Regular rate and rhythm; No murmurs, rubs, or gallops; No pitting edema  ABDOMEN: Soft, Nontender, Nondistended; Bowel sounds present; No HSM  MUSCULOSKELETAL/EXTREMITIES:  2+ Peripheral Pulses, No clubbing or digital cyanosis; brace to B/l LE  PSYCH: Appropriate affect, Alert & Awake; Good judgement    LABS:                        15.4   10.29 )-----------( 439      ( 09 May 2022 05:25 )             46.9       05-09    141  |  104  |  21  ----------------------------<  94  4.6   |  26  |  1.12    Ca    9.5      09 May 2022 05:25    TPro  8.4  /  Alb  3.6  /  TBili  0.4  /  DBili  x   /  AST  27  /  ALT  42  /  AlkPhos  81  05-09         COVID-19 PCR: Roz (05-01-22 @ 07:59)  COVID-19 PCR: Roz (04-24-22 @ 13:34)      Care Discussed with Rehab Attending and Other Providers

## 2022-05-11 NOTE — PROGRESS NOTE ADULT - REASON FOR ADMISSION
S/p Bilateral patellar tendon repair with NIKKI

## 2022-05-11 NOTE — DISCHARGE NOTE NURSING/CASE MANAGEMENT/SOCIAL WORK - NSDCFUADDAPPT_GEN_ALL_CORE_FT
PCP appointment scheduled for 6/8/22 at 12PM with Dr. Caryn Ford at [address] [place] [address].

## 2022-05-11 NOTE — PROGRESS NOTE ADULT - NS ATTEND AMEND GEN_ALL_CORE FT
Rehab Attending- Patient seen and examined by me- Case discussed, above note reviewed by me with modifications made    ready for DC  mildly tacchycardic but O2 sats 98%  no SOB, no calf pain/ swelling  Dc home with VN services  FU With Dr Erlinda dumont in AM  FU PMD- appt for 6/8 scheduled

## 2022-05-11 NOTE — PROGRESS NOTE ADULT - SUBJECTIVE AND OBJECTIVE BOX
CHIEF COMPLAINT: S/p Bilateral patellar tendon repair with NIKKI      HISTORY OF PRESENT ILLNESS  32 yo male w/ PSHx of right knee ORIF to ED [facility] on 4/24 w/ bilateral knee pain. Earlier that day playing basketball, attempted to jump but fell onto his knees. He experienced severe pain and was unable to ambulate. In ED: CT shows concern for patellar ligament rupture in L knee and fractured hardware in R knee with small hematoma. Ortho was consulted. Underwent bilateral knee tendon reconstruction 4/27. No complications. WBAT [place]. Pt was evaluated by PT with recommendation for acute rehab. Cleared for dc on 5/2.       Subjective/ROS:  Bowels improved on Metamucil- no cramping, no pain  Pain controlled well at present  no dizziness, no headaches  no Nausea, no Vomiting  No SOB, No Chest pain  -mild tachycardia at rest       MEDICATIONS  (STANDING):  acetaminophen     Tablet .. 650 milliGRAM(s) Oral every 8 hours  benzocaine 15 mG/menthol 3.6 mG Lozenge 1 Lozenge Oral five times a day  celecoxib 200 milliGRAM(s) Oral daily  enoxaparin Injectable 40 milliGRAM(s) SubCutaneous <User Schedule>  mupirocin 2% Ointment 1 Application(s) Topical <User Schedule>  psyllium Powder 1 Packet(s) Oral daily    MEDICATIONS  (PRN):  famotidine    Tablet 20 milliGRAM(s) Oral two times a day PRN Dyspepsia  simethicone 80 milliGRAM(s) Chew three times a day PRN Dyspepsia  traMADol 50 milliGRAM(s) Oral three times a day PRN Severe Pain (7 - 10)        Vital Signs Last 24 Hrs  T(C): 36.7 (11 May 2022 07:39), Max: 37.3 (10 May 2022 22:09)  T(F): 98 (11 May 2022 07:39), Max: 99.2 (10 May 2022 22:09)  HR: 95 (11 May 2022 07:39) (95 - 96)  BP: 137/85 (11 May 2022 07:39) (137/85 - 141/93)  BP(mean): --  RR: 16 (11 May 2022 07:39) (16 - 16)  SpO2: 98% (11 May 2022 07:39) (98% - 98%)        General: NAD in bed                              HEENT: NC/AT, EOMI  Cardio: RRR, Normal S1-S2, No M/G/R                              Pulm: No Respiratory Distress,  Lungs CTAB                        Abdomen: ND/NT, Soft, BS+                                                                                   Ext: No edema, No calf tenderness Maynor LES aquacel off- telfa over incisions- incisions dry, intact                                                            Decubitus Ulcers: None Present     Neurological Examination    Cognitive: AAO x 3                                                                         CN II - XII  intact                                                                     Sensory: Intact to light touch                                                                                           Motor    LEFT    UE: SF [5/5], EF [5/5], EE [5/5], WE [5/5],  [wnl]  RIGHT UE: SF [5/5], EF [5/5], EE [5/5], WE [5/5],  [wnl]  LEFT    LE:  HF [NT], KE [NT], DF [5/5], EHL [5/5],  PF [5/5]  RIGHT LE:  HF [NT], KE [NT], DF [5/5], EHL [5/5],  PF [5/5]    able to SLR Both LEs    Reflex:  2 +UES, ankle maynor      IDT meeting on 5/4    SW: Apt 4STE, 15STI, lives w/wife    OT:  eating set up  grooming set up  UBD/LBD set up/min A  toileting CG/min A  tub/shower min /CG  bathing min A    PT:  amb 50ft RW CG  transfers total, transfers  stairs 4st min A    SLP na   dc home w/HC on 5/17, completed comprehensive acute rehab program consisting of 3hrs/day of OT/PT.

## 2022-05-11 NOTE — DISCHARGE NOTE NURSING/CASE MANAGEMENT/SOCIAL WORK - PATIENT PORTAL LINK FT
You can access the FollowMyHealth Patient Portal offered by [facility] by registering at the following website: [email or web address]. By joining [facility]s FollowMyHealth portal, you will also be able to view your health information using other applications (apps) compatible with our system.

## 2022-05-11 NOTE — DISCHARGE NOTE NURSING/CASE MANAGEMENT/SOCIAL WORK - NSDCPEFALRISK_GEN_ALL_CORE
For information on Fall & Injury Prevention, visit: [email or web address] OR  [email or web address] OR  https://www.cdc.gov/steadi/patient.html

## 2022-05-11 NOTE — PROGRESS NOTE ADULT - ASSESSMENT
31M with no significant PMH, and PSHx of right knee ORIF, c/o b/l knee pain s/p fall while playing basketball, with left patellar ligament rupture, and fracture hardware in right knee. S/p bilateral patellar tendon repair with NIKKI of Right knee, now admitted to [facility] for acute rehab.    #Fall s/p Bilateral patellar tendon repair with NIKKI of right knee    -Continue comprehensive rehab program  -Continue pain regimen as per primary  -WBAT with denny braces locked in full extension    #Tachycardia - reolved  -Likely 2/2 pain, TSH WNL  -Encourage PO hydration  -Monitor vitals    #Thrombocytosis  -Likely reactive  -Continue to monitor    #Leukocytosis - resolved    #Transaminitis - resolved  -Avoid hepatotoxic medications  -Hydration encouraged    #Prophylactic Measure  -DVT ppx: Lovenox  -GI pp: Protonix    Medically stable for D/C
31M with no significant PMH, and PSHx of right knee ORIF, c/o b/l knee pain s/p fall while playing basketball, with left patellar ligament rupture, and fracture hardware in right knee. S/p bilateral patellar tendon repair with NIKKI of Right knee, now admitted to [facility] for acute rehab.    #Fall s/p Bilateral patellar tendon repair with NIKKI of right knee    -Continue comprehensive rehab program  -Continue pain regimen - Celebrex, Tylenol, Tramadol PRN  -WBAT of the BLE with denny braces locked in full extension    #Tachycardia   -Likely 2/2 pain  -Encourage PO hydration  -Monitor vitals    #Leukocytosis  -Likely reactive to above, downtrending  -Follow up routine CBC    #Thrombocytosis  -Likely reactive  -Follow up routine CBC    #Transaminitis - improving  -Avoid hepatotoxic medications  -Hydration encouraged  -Follow up routine CMP    #Prophylactic Measure  -DVT ppx: Lovenox  -GI pp: Protonix  -Bowel regimen  
31M with no significant PMH, and PSHx of right knee ORIF, c/o b/l knee pain s/p fall while playing basketball, with left patellar ligament rupture, and fracture hardware in right knee. S/p bilateral patellar tendon repair with NIKKI of Right knee, now admitted to [facility] for acute rehab.    #Fall s/p Bilateral patellar tendon repair with NIKKI of right knee    -Continue comprehensive rehab program  -Continue pain regimen as per primary  -WBAT with denny braces locked in full extension    #Tachycardia - reolved  -Likely 2/2 pain, TSH WNL  -Encourage PO hydration  -Monitor vitals    #Thrombocytosis  -Likely reactive  -Continue to monitor    #Leukocytosis - resolved    #Transaminitis - resolved  -Avoid hepatotoxic medications  -Hydration encouraged    #Prophylactic Measure  -DVT ppx: Lovenox  -GI pp: Protonix
31M with no significant PMH, and PSHx of right knee ORIF, c/o b/l knee pain s/p fall while playing basketball, with left patellar ligament rupture, and fracture hardware in right knee. S/p bilateral patellar tendon repair with NIKKI of Right knee, now admitted to [facility] for acute rehab.    #Fall s/p Bilateral patellar tendon repair with NIKKI of right knee    -Continue comprehensive rehab program  -Continue pain regimen as per primary  -WBAT with denny braces locked in full extension    #Tachycardia - reolved  -Likely 2/2 pain, TSH WNL  -Encourage PO hydration  -Monitor vitals    #Thrombocytosis  -Likely reactive  -Continue to monitor    #Leukocytosis - resolved    #Transaminitis - resolved  -Avoid hepatotoxic medications  -Hydration encouraged    #Prophylactic Measure  -DVT ppx: Lovenox  -GI pp: Protonix
Mechanical Fall s/p Bilateral patellar tendon repair with NIKKI of right knee    PT/OT per rehab  Pain control per rehab    Leukocytosis  Resolved  Monitor for now    Thrombocytosis sec to reactive process  Monitor for now    DVT ppx  Lovenox  
YANCI QURESHI  30yo Male S/p Bilateral patellar tendon repair with NIKKI of Right knee     Gait Instability, ADL impairments and Functional impairments: Comprehensive Rehab Program of PT/OT     - Pain management-transition to tylenol standing and prn tramadol. Monitor LFts.    - bowel regimen-bm today, mobilize  - DVT prophylaxis with Lovenox until 5/11 per ortho.   - Celebrex 200mg daily  - WBAT of the BLE with denny braces locked in full extension  - f/up ortho Dr. Coffey post rehab    #tachycardia  -encourage PO fluids, no respi sx  -monitor    Pain Mgmt - Tylenol PRN, tramadol PRN  GI/Bowel Mgmt - Senna,  Miralax PRN  /Bladder Mgmt - Voiding independently, PVR OK    tacchycardia  secondary to pain  O2 sats %    Precautions / PROPHYLAXIS:   - Falls  - Ortho: Weight bearing status: WBAT/denny  - Lungs:  Incentive Spirometer   - Pressure injury/Skin: Turn Q2hrs while in bed, OOB to Chair, PT/OT    - DVT: Lovenox
YANCI QURESHI  32yo Male S/p Bilateral patellar tendon repair with NIKKI of Right knee     Gait Instability, ADL impairments and Functional impairments: Comprehensive Rehab Program of PT/OT     - Pain management-transition to tylenol standing and prn tramadol. Monitor LFts. Comfortable and improving in therapy, reports pain 2/10.   - bowel regimen continues  - DVT prophylaxis with Lovenox until 5/11 per ortho.   - Celebrex 200mg daily  - WBAT of the BLE with denny braces locked in full extension  - f/up ortho Dr. Coffey post rehab    #tachycardia  -encourage PO fluids, no respi sx  -monitor    Pain Mgmt - Tylenol PRN, tramadol PRN  GI/Bowel Mgmt - Senna,  Miralax PRN  /Bladder Mgmt - Voiding independently, PVR OK    O2 sats % RA    Precautions / PROPHYLAXIS:   - Falls  - Ortho: Weight bearing status: WBAT/denny  - Lungs:  Incentive Spirometer   - Pressure injury/Skin: Turn Q2hrs while in bed, OOB to Chair, PT/OT    - DVT: Lovenox
YANCI QURESHI  32yo Male S/p Bilateral patellar tendon repair with NIKKI of Right knee     Gait Instability, ADL impairments and Functional impairments: Comprehensive Rehab Program of PT/OT     - Pain management-transition to tylenol standing and prn tramadol. Monitor LFts. Comfortable and improving in therapy, reports pain 2/10.   - bowel regimen continues  - DVT prophylaxis with Lovenox until 5/11 per ortho.   - Celebrex 200mg daily- x3 weeks per ortho  - WBAT of the BLE with denny braces locked in full extension  - f/up ortho Dr. Coffey post rehab  ANTICIPATE DC IN AM    #tachycardia  -encourage PO fluids, no respi sx  O2 sats % RA  -monitor    Pain Mgmt - Tylenol PRN, tramadol PRN  GI/Bowel Mgmt - Senna,  Miralax PRN  /Bladder Mgmt - Voiding independently, PVR OK        Precautions / PROPHYLAXIS:   - Falls  - Ortho: Weight bearing status: WBAT/denny  - Lungs:  Incentive Spirometer   - Pressure injury/Skin: Turn Q2hrs while in bed, OOB to Chair, PT/OT    - DVT: Lovenox
30 y/o M with no significant PMH, and PSHx of right knee ORIF, c/o b/l knee pain s/p fall while playing basketball, with left patellar ligament rupture, and fracture hardware in right knee. S/p bilateral patellar tendon repair with NIKKI of Right knee, now admitted to [facility] for acute rehab.    S/p Bilateral patellar tendon repair with NIKKI of right knee    -Start comprehensive rehab program - PT/OT/SLP per rehab team  -Pain management, bowel regimen per rehab  -WBAT of the BLE with denny braces locked in full extension  -Ortho follow up outpatient  -DVT ppx - Lovenox until 5/11 per ortho  -GI ppx - pepcid    #Tachycardia   -Likely 2/2 pain  -Encourage PO hydration  -Pain regimen per rehab    #Elevated LFTs  -Encourage PO hydration  -Monitor, limit hepatotoxins 
31M with no significant PMH, and PSHx of right knee ORIF, c/o b/l knee pain s/p fall while playing basketball, with left patellar ligament rupture, and fracture hardware in right knee. S/p bilateral patellar tendon repair with NIKKI of Right knee, now admitted to [facility] for acute rehab.    #Fall s/p Bilateral patellar tendon repair with NIKKI of right knee    -Continue comprehensive rehab program  -Continue pain regimen  -WBAT of the BLE with denny braces locked in full extension    #Tachycardia   -Likely 2/2 pain  -Encourage PO hydration  -Continue current pain regimen    #Leukocytosis  -Likely reactive to above  -Follow up routine CBC    #Thrombocytosis  -Likely reactive, downtrending  -Follow up routine CBC    #Transaminitis  -Avoid hepatotoxic medications  -Hydration encouraged  -Follow up routine CMP    #Prophylactic Measure  -DVT ppx: Lovenox  -GI pp: Protonix  -Bowel regimen
Mechanical Fall s/p Bilateral patellar tendon repair with NIKKI of right knee    PT/OT per rehab  Pain control per rehab    Leukocytosis  Resolved  Monitor for now    Thrombocytosis sec to reactive process  Monitor for now    DVT ppx  Lovenox  
YANCI QURESHI  32yo Male S/p Bilateral patellar tendon repair with NIKKI of Right knee     Gait Instability, ADL impairments and Functional impairments: Comprehensive Rehab Program of PT/OT     - Pain management-transition to tylenol standing and prn tramadol. Monitor LFts.    - bowel regimen-bm today, mobilize  - DVT prophylaxis with Lovenox until 5/11 per ortho.   - Celebrex 200mg daily  - WBAT of the BLE with denny braces locked in full extension  - f/up ortho Dr. Coffey post rehab    #tachycardia  -encourage PO fluids, no respi sx  -monitor    Pain Mgmt - Tylenol PRN, tramadol PRN  GI/Bowel Mgmt - Senna,  Miralax PRN  /Bladder Mgmt - Voiding independently, PVR OK    tacchycardia  secondary to pain  O2 sats %    Precautions / PROPHYLAXIS:   - Falls  - Ortho: Weight bearing status: WBAT/denny  - Lungs:  Incentive Spirometer   - Pressure injury/Skin: Turn Q2hrs while in bed, OOB to Chair, PT/OT    - DVT: Lovenox
YANCI QURESHI  32yo Male S/p Bilateral patellar tendon repair with NIKKI of Right knee     Gait Instability, ADL impairments and Functional impairments: Comprehensive Rehab Program of PT/OT   - DVT prophylaxis with Lovenox until 5/11 per ortho-completed  - Celebrex 200mg daily- x3 weeks per ortho  - WBAT of the BLE with denny braces locked in full extension  - f/up ortho Dr. Coffey post rehab  -dc home today, Dr Coffey to see on 5/12.    #tachycardia  -encourage PO fluids, no respi sx  -O2 sats % RA  -PCP follow up  
YANCI QURESHI  32yo Male S/p Bilateral patellar tendon repair with NIKKI of Right knee     Gait Instability, ADL impairments and Functional impairments: start Comprehensive Rehab Program of PT/OT     - Pain management  - bowel regimen  - DVT prophylaxis with Lovenox until 5/11 per ortho  - Celebrex 200mg daily  - WBAT of the BLE with denny braces locked in full extension  - f/up ortho Dr. Coffey post rehab  - mobilize      Pain Mgmt - Tylenol PRN, tramadol PRN  GI/Bowel Mgmt - Senna,  Miralax PRN  /Bladder Mgmt - Voiding independently, PVR OK    tacchycardia  secondary to pain  O2 sats %    Precautions / PROPHYLAXIS:   - Falls  - Ortho: Weight bearing status: WBAT/denny  - Lungs:  Incentive Spirometer   - Pressure injury/Skin: Turn Q2hrs while in bed, OOB to Chair, PT/OT    - DVT: Lovenox
32 y/o M with no significant PMH, and PSHx of right knee ORIF, c/o b/l knee pain s/p fall while playing basketball, with left patellar ligament rupture, and fracture hardware in right knee. S/p bilateral patellar tendon repair with NIKKI of Right knee, now admitted to [facility] for acute rehab.    S/p Bilateral patellar tendon repair with NIKKI of right knee    -Start comprehensive rehab program - PT/OT/SLP per rehab team  -Pain management, bowel regimen per rehab  -WBAT of the BLE with denny braces locked in full extension  -Ortho follow up outpatient  -DVT ppx - Lovenox until 5/11 per ortho  -GI ppx - pepcid    #Tachycardia   -Likely 2/2 pain  -Encourage PO hydration  -Pain regimen per rehab  -Will send TSH    #Elevated LFTs  -Encourage PO hydration  -Monitor, limit hepatotoxins 
YANCI QURESHI  32yo Male S/p Bilateral patellar tendon repair with NIKKI of Right knee     Gait Instability, ADL impairments and Functional impairments: Comprehensive Rehab Program of PT/OT     - Pain management-transition to tylenol standing and prn tramadol. Monitor LFts. Comfortable and improving in therapy, reports pain 2/10.   - bowel regimen continues  - DVT prophylaxis with Lovenox until 5/11 per ortho.   - Celebrex 200mg daily- will contact ortho for duration  - WBAT of the BLE with denny braces locked in full extension  - f/up ortho Dr. Coffey post rehab    #tachycardia  -encourage PO fluids, no respi sx  -monitor    Pain Mgmt - Tylenol PRN, tramadol PRN  GI/Bowel Mgmt - Senna,  Miralax PRN  /Bladder Mgmt - Voiding independently, PVR OK    O2 sats % RA    Precautions / PROPHYLAXIS:   - Falls  - Ortho: Weight bearing status: WBAT/denny  - Lungs:  Incentive Spirometer   - Pressure injury/Skin: Turn Q2hrs while in bed, OOB to Chair, PT/OT    - DVT: Lovenox

## 2022-06-08 ENCOUNTER — NON-APPOINTMENT (OUTPATIENT)
Age: 32
End: 2022-06-08

## 2022-06-08 ENCOUNTER — APPOINTMENT (OUTPATIENT)
Dept: INTERNAL MEDICINE | Facility: CLINIC | Age: 32
End: 2022-06-08
Payer: COMMERCIAL

## 2022-06-08 VITALS
HEIGHT: 70.25 IN | SYSTOLIC BLOOD PRESSURE: 134 MMHG | WEIGHT: 186 LBS | OXYGEN SATURATION: 98 % | BODY MASS INDEX: 26.63 KG/M2 | DIASTOLIC BLOOD PRESSURE: 82 MMHG | HEART RATE: 92 BPM | TEMPERATURE: 98.5 F

## 2022-06-08 DIAGNOSIS — Z83.3 FAMILY HISTORY OF DIABETES MELLITUS: ICD-10-CM

## 2022-06-08 DIAGNOSIS — Z78.9 OTHER SPECIFIED HEALTH STATUS: ICD-10-CM

## 2022-06-08 DIAGNOSIS — Z00.00 ENCOUNTER FOR GENERAL ADULT MEDICAL EXAMINATION W/OUT ABNORMAL FINDINGS: ICD-10-CM

## 2022-06-08 DIAGNOSIS — Z23 ENCOUNTER FOR IMMUNIZATION: ICD-10-CM

## 2022-06-08 PROCEDURE — 99385 PREV VISIT NEW AGE 18-39: CPT | Mod: 25

## 2022-06-08 PROCEDURE — G0444 DEPRESSION SCREEN ANNUAL: CPT | Mod: 59

## 2022-06-08 PROCEDURE — 90471 IMMUNIZATION ADMIN: CPT

## 2022-06-08 PROCEDURE — 90715 TDAP VACCINE 7 YRS/> IM: CPT

## 2022-06-08 PROCEDURE — G0442 ANNUAL ALCOHOL SCREEN 15 MIN: CPT

## 2022-06-08 RX ORDER — FOLIC ACID 1 MG/1
1 TABLET ORAL
Refills: 0 | Status: ACTIVE | COMMUNITY

## 2022-06-08 NOTE — HEALTH RISK ASSESSMENT
[Good] : ~his/her~  mood as  good [Never] : Never [Yes] : Yes [Monthly or less (1 pt)] : Monthly or less (1 point) [1 or 2 (0 pts)] : 1 or 2 (0 points) [Never (0 pts)] : Never (0 points) [No falls in past year] : Patient reported no falls in the past year [0] : 2) Feeling down, depressed, or hopeless: Not at all (0) [PHQ-2 Negative - No further assessment needed] : PHQ-2 Negative - No further assessment needed [With Significant Other] : lives with significant other [Employed] : employed [] :  [# Of Children ___] : has [unfilled] children [HIV test declined] : HIV test declined [Hepatitis C test declined] : Hepatitis C test declined [Audit-CScore] : 1 [de-identified] : basket ball weekly [[id number]] : 0 [Reports changes in hearing] : Reports no changes in hearing [Reports changes in vision] : Reports no changes in vision [Reports changes in dental health] : Reports no changes in dental health [FreeTextEntry2] :

## 2022-06-08 NOTE — HISTORY OF PRESENT ILLNESS
[de-identified] : New pt \par \par S/p Bilateral patellar tendon repair with NIKKI \par 33 yo male w/ PSHx of [facility]/ bilateral knee pain. Earlier that day playing basketball, attempted to jump but fell onto his knees. He experienced severe pain and was unable to ambulate. In ED: CT shows concern for patellar ligament rupture in L knee and fractured hardware in R knee with small hematoma. Ortho was consulted. Underwent bilateral knee tendon \par reconstruction 4/27. No complications. WBAT [place]. Pt was evaluated by PT with recommendation for acute rehab. Cleared for dc on 5/2. At [facility] rehab patient completed comprehensive PT OT program and reached his rehab goals on 5/11. While at rehab patient tolerated Lovenox. Bowel regimen instituted. Cleared for dc on 5/11. Celebrex x 3weeks per ortho\par -now doing Pt home 2 x a week - now will be starting outpatient 3 x a week \par -ortho f/u Dr Domitila Coffey [place] - last visit was 5/12 has f/u 6/16/22 knee brace - doing full weight bearing \par - took celebrex for 1 week - takes tylenol as needed , FA qd \par \par c/o occ headace - working form home - lot of screen time - no N or v - vision changes

## 2022-06-08 NOTE — ASSESSMENT
[FreeTextEntry1] : S/p Bilateral patellar tendon repair with NIKKI 4/2022 \par - cont PT 3 x a week \par - Knee braces b/l - keep appt ortho 6/16/22 \par \par occ headaces occipital area\par .advised hydration , reduce caffine and sleep better \par -avoid screen time \par - log s/s call if worse or no improvement \par -advised advil prn as needed \par \par HCM \par Tdap-6/8/22\par STD -offered - pt deferred \par Covid vaccine - pfizer-5/29/ 2021 completed - bosoter advised

## 2022-06-09 DIAGNOSIS — E56.9 VITAMIN DEFICIENCY, UNSPECIFIED: ICD-10-CM

## 2022-06-09 DIAGNOSIS — E78.5 HYPERLIPIDEMIA, UNSPECIFIED: ICD-10-CM

## 2022-06-09 LAB
25(OH)D3 SERPL-MCNC: 10.6 NG/ML
ALBUMIN SERPL ELPH-MCNC: 4.8 G/DL
ALP BLD-CCNC: 82 U/L
ALT SERPL-CCNC: 24 U/L
ANION GAP SERPL CALC-SCNC: 13 MMOL/L
APPEARANCE: CLEAR
AST SERPL-CCNC: 27 U/L
BASOPHILS # BLD AUTO: 0.07 K/UL
BASOPHILS NFR BLD AUTO: 0.8 %
BILIRUB SERPL-MCNC: 0.3 MG/DL
BILIRUBIN URINE: NEGATIVE
BLOOD URINE: NEGATIVE
BUN SERPL-MCNC: 21 MG/DL
CALCIUM SERPL-MCNC: 9.9 MG/DL
CHLORIDE SERPL-SCNC: 101 MMOL/L
CHOLEST SERPL-MCNC: 196 MG/DL
CO2 SERPL-SCNC: 26 MMOL/L
COLOR: YELLOW
CREAT SERPL-MCNC: 1.25 MG/DL
EGFR: 78 ML/MIN/1.73M2
EOSINOPHIL # BLD AUTO: 0.53 K/UL
EOSINOPHIL NFR BLD AUTO: 6 %
ESTIMATED AVERAGE GLUCOSE: 114 MG/DL
GLUCOSE QUALITATIVE U: NEGATIVE
GLUCOSE SERPL-MCNC: 129 MG/DL
HBA1C MFR BLD HPLC: 5.6 %
HCT VFR BLD CALC: 50.3 %
HDLC SERPL-MCNC: 38 MG/DL
HGB BLD-MCNC: 16 G/DL
IMM GRANULOCYTES NFR BLD AUTO: 0.7 %
KETONES URINE: NEGATIVE
LDLC SERPL CALC-MCNC: 114 MG/DL
LEUKOCYTE ESTERASE URINE: NEGATIVE
LYMPHOCYTES # BLD AUTO: 2.18 K/UL
LYMPHOCYTES NFR BLD AUTO: 24.9 %
MAN DIFF?: NORMAL
MCHC RBC-ENTMCNC: 26.6 PG
MCHC RBC-ENTMCNC: 31.8 GM/DL
MCV RBC AUTO: 83.7 FL
MONOCYTES # BLD AUTO: 0.64 K/UL
MONOCYTES NFR BLD AUTO: 7.3 %
NEUTROPHILS # BLD AUTO: 5.29 K/UL
NEUTROPHILS NFR BLD AUTO: 60.3 %
NITRITE URINE: NEGATIVE
NONHDLC SERPL-MCNC: 158 MG/DL
PH URINE: 6
PLATELET # BLD AUTO: 316 K/UL
POTASSIUM SERPL-SCNC: 4.6 MMOL/L
PROT SERPL-MCNC: 7.9 G/DL
PROTEIN URINE: NORMAL
RBC # BLD: 6.01 M/UL
RBC # FLD: 13.3 %
SODIUM SERPL-SCNC: 140 MMOL/L
SPECIFIC GRAVITY URINE: 1.03
TRIGL SERPL-MCNC: 223 MG/DL
TSH SERPL-ACNC: 1.51 UIU/ML
UROBILINOGEN URINE: NORMAL
WBC # FLD AUTO: 8.77 K/UL

## 2022-06-09 RX ORDER — ERGOCALCIFEROL 1.25 MG/1
1.25 MG CAPSULE, LIQUID FILLED ORAL
Qty: 12 | Refills: 1 | Status: ACTIVE | COMMUNITY
Start: 2022-06-09 | End: 1900-01-01

## 2022-08-04 ENCOUNTER — TRANSCRIPTION ENCOUNTER (OUTPATIENT)
Age: 32
End: 2022-08-04

## 2022-08-18 NOTE — CONSULT NOTE ADULT - PROBLEM SELECTOR PROBLEM 3
Rupture of right patellar tendon Methotrexate Pregnancy And Lactation Text: This medication is Pregnancy Category X and is known to cause fetal harm. This medication is excreted in breast milk.

## 2023-09-14 NOTE — PATIENT PROFILE ADULT - FALL HARM RISK - FALL HARM RISK
Patient had concerns about diclofenac being discontinued by Dr. Lanza.   Upon chart review, it appears refill for this medication was erroneously sent to Dr. Lanza when it was originally ordered by Adrienne Camara NP in Pain Management. Dr. Lanza discontinued medication because she did not order it/does not promote long-term NSAID use. Short term Mobic prescription sent instead.     Discussed with pt, verbalized understanding. Communicated that for future refills to reach out to the pain management clinic and to schedule an appointment to see one of the APPs for medication management.   All questions answered.     Bone Condition/Surgery

## 2023-12-01 NOTE — PATIENT PROFILE ADULT - NSFALLSECTIONLABEL_GEN_A_CORE
Patient called in to let us know the pharmacy needs a new script for pantoprazole. They need a new script with just the current directions of once daily. Previous script had  Take twice daily for 6 weeks, one tablet 30-60 minutes before breakfast and one tablet 30-60 minutes before dinner. Then wean back down to once daily dosing. They are unable to do the refill with those directions. Please review and advise.
.

## 2024-01-24 NOTE — ED ADULT NURSE NOTE - DISCHARGE DATE/TIME
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    Physical Exam  Areas examined today include the right and left lower extremities, buttocks, abdomen, chest, back, right and left upper extremities, face, neck, and scalp. Appears to be alert and orientated x 3, no abnormalities detected, pleasant affect, and well-groomed. Oral and ocular mucosa are clear.   1. Well healed scar(s) from previously treated skin cancer(s). No sign of recurrence.  2. Subtle benign cobbled brown lesions, erythematous papules and benign appearing nevi scattered across the body.   3. Erythematous scaly papule on the right forearm x 1, inferior lip x 1 (photograph obtained).    Assessment/Plan  1. History of Basal cell carcinoma skin cancer.   2. Benign seborrheic keratoses, hemangiomas and nevi. Reassurance.  3. Liquid nitrogen was applied to a total of 2 actinic keratoses for 10-12 seconds to the lesion(s) and the expected blistering or scabbing reaction explained.  Return if lesion(s) fail to fully resolve.     Sun block, sun protection, sun avoidance all discussed with patient.  Encouraged once a month self skin exam.   Notify us of any changes or concerns.    Follow-up   Follow-up in 8-9 months for a complete skin exam.    On 1/24/2024, Avelina TIRADO MA scribed the services personally performed by Dr Raines.    The documentation recorded by the scribe accurately and completely reflects the service(s) I personally performed and  the decisions made by me.      24-Apr-2022 17:13

## 2024-02-10 ENCOUNTER — APPOINTMENT (OUTPATIENT)
Dept: RADIOLOGY | Facility: IMAGING CENTER | Age: 34
End: 2024-02-10

## 2024-08-13 ENCOUNTER — APPOINTMENT (OUTPATIENT)
Dept: INTERNAL MEDICINE | Facility: CLINIC | Age: 34
End: 2024-08-13
Payer: COMMERCIAL

## 2024-08-13 VITALS
OXYGEN SATURATION: 97 % | WEIGHT: 209 LBS | HEIGHT: 70 IN | BODY MASS INDEX: 29.92 KG/M2 | DIASTOLIC BLOOD PRESSURE: 86 MMHG | HEART RATE: 85 BPM | TEMPERATURE: 98.5 F | SYSTOLIC BLOOD PRESSURE: 138 MMHG

## 2024-08-13 VITALS — DIASTOLIC BLOOD PRESSURE: 61 MMHG | SYSTOLIC BLOOD PRESSURE: 132 MMHG

## 2024-08-13 DIAGNOSIS — Z00.00 ENCOUNTER FOR GENERAL ADULT MEDICAL EXAMINATION W/OUT ABNORMAL FINDINGS: ICD-10-CM

## 2024-08-13 PROCEDURE — 99395 PREV VISIT EST AGE 18-39: CPT

## 2024-08-13 NOTE — HEALTH RISK ASSESSMENT
[Good] : ~his/her~  mood as  good [No] : In the past 12 months have you used drugs other than those required for medical reasons? 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

## 2024-08-13 NOTE — HEALTH RISK ASSESSMENT
[Good] : ~his/her~  mood as  good [No] : In the past 12 months have you used drugs other than those required for medical reasons? 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

## 2024-08-13 NOTE — HISTORY OF PRESENT ILLNESS
[de-identified] : seen for CPE  last seen 2 yrs ago   had headace few 1 week ago when weather was hot - better now  gained weight  S/p Bilateral patellar tendon repair with NIKKI  33 yo male w/ PSHx of right knee ORIF to ED [facility] on 4/24/22 w/ bilateral knee pain. Earlier that day playing basketball, attempted to jump but fell onto his knees. He experienced severe pain and was unable to ambulate. In ED: CT shows concern for patellar ligament rupture in L knee and fractured hardware in R knee with small hematoma. Ortho was consulted. Underwent bilateral knee tendon  reconstruction 4/27. No complications. WBAT [place]. Pt was evaluated by PT with recommendation for acute rehab. Cleared for dc on 5/2. At [facility] rehab patient completed comprehensive PT OT program and reached his rehab goals on 5/11. While at rehab patient tolerated Lovenox. Bowel regimen instituted. Cleared for dc on 5/11. Celebrex x 3weeks per ortho -now doing Pt home 2 x a week - now will be starting outpatient 3 x a week  -ortho f/u Dr Ramesh  An [place] - last visit was 5/12 has f/u 6/16/22 knee brace - doing full weight bearing  - took celebrex for 1 week - takes tylenol as needed , FA qd   c/o occ headace - working form home - lot of screen time - no N or v - vision changes

## 2024-08-13 NOTE — ASSESSMENT
[FreeTextEntry1] : BMI -29  -check lipids, AIC , TSH  - discussed caloric control , portion control , weight loss, increase physical activity  Vitamin d deficiency - start 2000 units OTC daily .  Dyslipidemia -Low HDL- high Triglycerides -start aerobic exercise moderate intensity 40 min 3 x a week - loos 5 % body weight - avoid fried food and high carb diet  S/p Bilateral patellar tendon repair with NIKKI 4/2022  - cont PT 3 x a week  - Knee braces b/l - keep appt ortho 6/16/22   occ headaces occipital area .advised hydration , reduce caffine and sleep better  -avoid screen time  - log s/s call if worse or no improvement  -advised advil prn as needed   HCM  Tdap-6/8/22 STD -offered - pt deferred  Covid vaccine - pfizer-5/29/ 2021 completed - claudio advised

## 2024-08-13 NOTE — HISTORY OF PRESENT ILLNESS
[de-identified] : seen for CPE  last seen 2 yrs ago   had headace few 1 week ago when weather was hot - better now  gained weight  S/p Bilateral patellar tendon repair with NIKKI  33 yo male w/ PSHx of right knee ORIF to ED [facility] on 4/24/22 w/ bilateral knee pain. Earlier that day playing basketball, attempted to jump but fell onto his knees. He experienced severe pain and was unable to ambulate. In ED: CT shows concern for patellar ligament rupture in L knee and fractured hardware in R knee with small hematoma. Ortho was consulted. Underwent bilateral knee tendon  reconstruction 4/27. No complications. WBAT [place]. Pt was evaluated by PT with recommendation for acute rehab. Cleared for dc on 5/2. At [facility] rehab patient completed comprehensive PT OT program and reached his rehab goals on 5/11. While at rehab patient tolerated Lovenox. Bowel regimen instituted. Cleared for dc on 5/11. Celebrex x 3weeks per ortho -now doing Pt home 2 x a week - now will be starting outpatient 3 x a week  -ortho f/u Dr Ramesh  [place] - last visit was 5/12 has f/u 6/16/22 knee brace - doing full weight bearing  - took celebrex for 1 week - takes tylenol as needed , FA qd   c/o occ headace - working form home - lot of screen time - no N or v - vision changes

## 2024-08-13 NOTE — PHYSICAL EXAM
moderate [No Acute Distress] : no acute distress [Normal Sclera/Conjunctiva] : normal sclera/conjunctiva [PERRL] : pupils equal round and reactive to light [EOMI] : extraocular movements intact [Normal Outer Ear/Nose] : the outer ears and nose were normal in appearance [Normal Oropharynx] : the oropharynx was normal [No JVD] : no jugular venous distention No [No Lymphadenopathy] : no lymphadenopathy [Supple] : supple [Thyroid Normal, No Nodules] : the thyroid was normal and there were no nodules present [No Respiratory Distress] : no respiratory distress  [No Accessory Muscle Use] : no accessory muscle use [Clear to Auscultation] : lungs were clear to auscultation bilaterally [Normal Rate] : normal rate  [Regular Rhythm] : with a regular rhythm [Normal S1, S2] : normal S1 and S2 [No Murmur] : no murmur heard [No Varicosities] : no varicosities [Pedal Pulses Present] : the pedal pulses are present [No Edema] : there was no peripheral edema [No Extremity Clubbing/Cyanosis] : no extremity clubbing/cyanosis [Soft] : abdomen soft [Non Tender] : non-tender [Non-distended] : non-distended [No Masses] : no abdominal mass palpated [No HSM] : no HSM [Normal Bowel Sounds] : normal bowel sounds [Normal Posterior Cervical Nodes] : no posterior cervical lymphadenopathy [Normal Anterior Cervical Nodes] : no anterior cervical lymphadenopathy [No CVA Tenderness] : no CVA  tenderness [No Spinal Tenderness] : no spinal tenderness [No Joint Swelling] : no joint swelling [Grossly Normal Strength/Tone] : grossly normal strength/tone [No Rash] : no rash [Coordination Grossly Intact] : coordination grossly intact [No Focal Deficits] : no focal deficits [Normal Gait] : normal gait [Deep Tendon Reflexes (DTR)] : deep tendon reflexes were 2+ and symmetric [Normal Affect] : the affect was normal [Normal Insight/Judgement] : insight and judgment were intact

## 2024-08-14 LAB
ALBUMIN SERPL ELPH-MCNC: 4.6 G/DL
ALP BLD-CCNC: 76 U/L
ALT SERPL-CCNC: 17 U/L
ANION GAP SERPL CALC-SCNC: 15 MMOL/L
APPEARANCE: CLEAR
AST SERPL-CCNC: 23 U/L
BACTERIA: NEGATIVE /HPF
BILIRUB SERPL-MCNC: 0.5 MG/DL
BILIRUBIN URINE: NEGATIVE
BLOOD URINE: NEGATIVE
BUN SERPL-MCNC: 14 MG/DL
CALCIUM SERPL-MCNC: 9.9 MG/DL
CAST: 0 /LPF
CHLORIDE SERPL-SCNC: 98 MMOL/L
CHOLEST SERPL-MCNC: 205 MG/DL
CO2 SERPL-SCNC: 25 MMOL/L
COLOR: YELLOW
CREAT SERPL-MCNC: 1.21 MG/DL
EGFR: 81 ML/MIN/1.73M2
EPITHELIAL CELLS: 0 /HPF
ESTIMATED AVERAGE GLUCOSE: 128 MG/DL
FOLATE SERPL-MCNC: 9.5 NG/ML
GLUCOSE QUALITATIVE U: NEGATIVE MG/DL
GLUCOSE SERPL-MCNC: 127 MG/DL
HBA1C MFR BLD HPLC: 6.1 %
HCT VFR BLD CALC: 51.5 %
HDLC SERPL-MCNC: 42 MG/DL
HGB BLD-MCNC: 17 G/DL
KETONES URINE: NEGATIVE MG/DL
LDLC SERPL CALC-MCNC: 119 MG/DL
LEUKOCYTE ESTERASE URINE: NEGATIVE
MCHC RBC-ENTMCNC: 26.4 PG
MCHC RBC-ENTMCNC: 33 GM/DL
MCV RBC AUTO: 80.1 FL
MICROSCOPIC-UA: NORMAL
NITRITE URINE: NEGATIVE
NONHDLC SERPL-MCNC: 163 MG/DL
PH URINE: 5.5
PLATELET # BLD AUTO: 323 K/UL
POTASSIUM SERPL-SCNC: 3.8 MMOL/L
PROT SERPL-MCNC: 7.9 G/DL
PROTEIN URINE: NEGATIVE MG/DL
RBC # BLD: 6.43 M/UL
RBC # FLD: 14.2 %
RED BLOOD CELLS URINE: 1 /HPF
SODIUM SERPL-SCNC: 138 MMOL/L
SPECIFIC GRAVITY URINE: 1.03
TRIGL SERPL-MCNC: 249 MG/DL
TSH SERPL-ACNC: 1.28 UIU/ML
UROBILINOGEN URINE: 1 MG/DL
VIT B12 SERPL-MCNC: 390 PG/ML
WBC # FLD AUTO: 9.66 K/UL
WHITE BLOOD CELLS URINE: 0 /HPF

## 2025-02-05 ENCOUNTER — NON-APPOINTMENT (OUTPATIENT)
Age: 35
End: 2025-02-05

## 2025-02-11 ENCOUNTER — APPOINTMENT (OUTPATIENT)
Dept: INTERNAL MEDICINE | Facility: CLINIC | Age: 35
End: 2025-02-11
Payer: COMMERCIAL

## 2025-02-11 VITALS
HEART RATE: 82 BPM | DIASTOLIC BLOOD PRESSURE: 96 MMHG | OXYGEN SATURATION: 97 % | TEMPERATURE: 99 F | WEIGHT: 172 LBS | SYSTOLIC BLOOD PRESSURE: 149 MMHG | HEIGHT: 70 IN | BODY MASS INDEX: 24.62 KG/M2

## 2025-02-11 VITALS — DIASTOLIC BLOOD PRESSURE: 84 MMHG | SYSTOLIC BLOOD PRESSURE: 136 MMHG

## 2025-02-11 DIAGNOSIS — R73.03 PREDIABETES.: ICD-10-CM

## 2025-02-11 DIAGNOSIS — E78.5 HYPERLIPIDEMIA, UNSPECIFIED: ICD-10-CM

## 2025-02-11 DIAGNOSIS — J02.0 STREPTOCOCCAL PHARYNGITIS: ICD-10-CM

## 2025-02-11 DIAGNOSIS — E56.9 VITAMIN DEFICIENCY, UNSPECIFIED: ICD-10-CM

## 2025-02-11 LAB — S PYO AG SPEC QL IA: POSITIVE

## 2025-02-11 PROCEDURE — 99214 OFFICE O/P EST MOD 30 MIN: CPT

## 2025-02-11 PROCEDURE — 87880 STREP A ASSAY W/OPTIC: CPT | Mod: QW

## 2025-02-11 RX ORDER — AMOXICILLIN AND CLAVULANATE POTASSIUM 500; 125 MG/1; MG/1
500-125 TABLET, FILM COATED ORAL TWICE DAILY
Qty: 20 | Refills: 0 | Status: ACTIVE | COMMUNITY
Start: 2025-02-11 | End: 1900-01-01

## 2025-02-12 LAB
25(OH)D3 SERPL-MCNC: 23 NG/ML
CHOLEST SERPL-MCNC: 170 MG/DL
ESTIMATED AVERAGE GLUCOSE: 111 MG/DL
HBA1C MFR BLD HPLC: 5.5 %
HDLC SERPL-MCNC: 35 MG/DL
LDLC SERPL CALC-MCNC: 112 MG/DL
NONHDLC SERPL-MCNC: 135 MG/DL
TRIGL SERPL-MCNC: 129 MG/DL
VIT B12 SERPL-MCNC: 651 PG/ML

## 2025-08-19 ENCOUNTER — APPOINTMENT (OUTPATIENT)
Dept: INTERNAL MEDICINE | Facility: CLINIC | Age: 35
End: 2025-08-19
Payer: COMMERCIAL

## 2025-08-19 VITALS
SYSTOLIC BLOOD PRESSURE: 134 MMHG | OXYGEN SATURATION: 98 % | BODY MASS INDEX: 26.54 KG/M2 | HEART RATE: 85 BPM | DIASTOLIC BLOOD PRESSURE: 87 MMHG | TEMPERATURE: 98.2 F | WEIGHT: 185 LBS

## 2025-08-19 DIAGNOSIS — Z00.00 ENCOUNTER FOR GENERAL ADULT MEDICAL EXAMINATION W/OUT ABNORMAL FINDINGS: ICD-10-CM

## 2025-08-19 DIAGNOSIS — J02.0 STREPTOCOCCAL PHARYNGITIS: ICD-10-CM

## 2025-08-19 PROCEDURE — 99395 PREV VISIT EST AGE 18-39: CPT

## 2025-08-20 LAB
25(OH)D3 SERPL-MCNC: 34 NG/ML
ALBUMIN SERPL ELPH-MCNC: 4.7 G/DL
ALP BLD-CCNC: 63 U/L
ALT SERPL-CCNC: 7 U/L
ANION GAP SERPL CALC-SCNC: 16 MMOL/L
APPEARANCE: CLEAR
AST SERPL-CCNC: 22 U/L
BACTERIA: NEGATIVE /HPF
BILIRUB SERPL-MCNC: 0.5 MG/DL
BILIRUBIN URINE: NEGATIVE
BLOOD URINE: NEGATIVE
BUN SERPL-MCNC: 13 MG/DL
CALCIUM SERPL-MCNC: 10.1 MG/DL
CAST: 0 /LPF
CHLORIDE SERPL-SCNC: 100 MMOL/L
CHOLEST SERPL-MCNC: 194 MG/DL
CO2 SERPL-SCNC: 25 MMOL/L
COLOR: YELLOW
CREAT SERPL-MCNC: 1.2 MG/DL
EGFRCR SERPLBLD CKD-EPI 2021: 81 ML/MIN/1.73M2
EPITHELIAL CELLS: 0 /HPF
ESTIMATED AVERAGE GLUCOSE: 114 MG/DL
GLUCOSE QUALITATIVE U: NEGATIVE MG/DL
GLUCOSE SERPL-MCNC: 91 MG/DL
HBA1C MFR BLD HPLC: 5.6 %
HCT VFR BLD CALC: 50.1 %
HDLC SERPL-MCNC: 47 MG/DL
HGB BLD-MCNC: 16.5 G/DL
KETONES URINE: NEGATIVE MG/DL
LDLC SERPL-MCNC: 116 MG/DL
LEUKOCYTE ESTERASE URINE: NEGATIVE
MCHC RBC-ENTMCNC: 27.2 PG
MCHC RBC-ENTMCNC: 32.9 G/DL
MCV RBC AUTO: 82.7 FL
MICROSCOPIC-UA: NORMAL
NITRITE URINE: NEGATIVE
NONHDLC SERPL-MCNC: 147 MG/DL
PH URINE: 6
PLATELET # BLD AUTO: 277 K/UL
POTASSIUM SERPL-SCNC: 4.4 MMOL/L
PROT SERPL-MCNC: 8 G/DL
PROTEIN URINE: NEGATIVE MG/DL
RBC # BLD: 6.06 M/UL
RBC # FLD: 13.2 %
RED BLOOD CELLS URINE: 0 /HPF
SODIUM SERPL-SCNC: 141 MMOL/L
SPECIFIC GRAVITY URINE: 1.01
TRIGL SERPL-MCNC: 179 MG/DL
TSH SERPL-ACNC: 1.36 UIU/ML
UROBILINOGEN URINE: 0.2 MG/DL
VIT B12 SERPL-MCNC: 581 PG/ML
WBC # FLD AUTO: 9.24 K/UL
WHITE BLOOD CELLS URINE: 0 /HPF

## (undated) DEVICE — SUT MONOCRYL 4-0 27" PS-2 UNDYED

## (undated) DEVICE — SUT HEWSON RETRIEVER

## (undated) DEVICE — Device

## (undated) DEVICE — SOL IRR POUR H2O 1000ML

## (undated) DEVICE — DRAPE HAND TABLE EXTENSION

## (undated) DEVICE — SUT VICRYL PLUS 4-0 18" PS-2 UNDYED

## (undated) DEVICE — NDL HYPO REGULAR BEVEL 25G X 1.5"

## (undated) DEVICE — SOL IRR POUR NS 0.9% 1000ML

## (undated) DEVICE — DRAPE TOWEL BLUE 17" X 24"

## (undated) DEVICE — DRSG AQUACEL 3.5X10"

## (undated) DEVICE — SUT DERMABOND PRINEO 42CM

## (undated) DEVICE — PACK EXTREMITY SOUTHSIDE

## (undated) DEVICE — SUT FIBERWIRE #2 38"

## (undated) DEVICE — SUT POLYSORB 1 18" UNDYED

## (undated) DEVICE — GLV 8 PROTEXIS

## (undated) DEVICE — DRAPE C ARM UNIVERSAL

## (undated) DEVICE — FOR-TOURNIQUET 1130808363: Type: DURABLE MEDICAL EQUIPMENT

## (undated) DEVICE — SUT POLYSORB 2-0 30" GS-10 UNDYED

## (undated) DEVICE — BLANKET WARMER LOWER ADULT

## (undated) DEVICE — SUT VICRYL 3-0 27" CT-2 UNDYED

## (undated) DEVICE — WRAP COMPRESSION CALF MED

## (undated) DEVICE — FRAZIER SUCTION TIP 10FR

## (undated) DEVICE — FOR-ESU VALLEYLAB T7E14842DX: Type: DURABLE MEDICAL EQUIPMENT